# Patient Record
Sex: FEMALE | Race: WHITE | Employment: OTHER | URBAN - METROPOLITAN AREA
[De-identification: names, ages, dates, MRNs, and addresses within clinical notes are randomized per-mention and may not be internally consistent; named-entity substitution may affect disease eponyms.]

---

## 2017-01-16 ENCOUNTER — PROCEDURE VISIT (OUTPATIENT)
Dept: CARDIOLOGY CLINIC | Age: 82
End: 2017-01-16

## 2017-01-16 DIAGNOSIS — Z95.0 CARDIAC PACEMAKER IN SITU: Primary | ICD-10-CM

## 2017-01-16 PROCEDURE — 93296 REM INTERROG EVL PM/IDS: CPT | Performed by: INTERNAL MEDICINE

## 2017-01-16 PROCEDURE — 93294 REM INTERROG EVL PM/LDLS PM: CPT | Performed by: INTERNAL MEDICINE

## 2017-04-24 ENCOUNTER — TELEPHONE (OUTPATIENT)
Dept: CARDIOLOGY CLINIC | Age: 82
End: 2017-04-24

## 2017-04-24 ENCOUNTER — PROCEDURE VISIT (OUTPATIENT)
Dept: CARDIOLOGY CLINIC | Age: 82
End: 2017-04-24

## 2017-04-24 DIAGNOSIS — Z95.0 CARDIAC PACEMAKER IN SITU: Primary | ICD-10-CM

## 2017-04-24 PROCEDURE — 93294 REM INTERROG EVL PM/LDLS PM: CPT | Performed by: INTERNAL MEDICINE

## 2017-04-24 PROCEDURE — 93296 REM INTERROG EVL PM/IDS: CPT | Performed by: INTERNAL MEDICINE

## 2017-07-14 ENCOUNTER — TELEPHONE (OUTPATIENT)
Dept: CARDIOLOGY CLINIC | Age: 82
End: 2017-07-14

## 2017-07-31 ENCOUNTER — TELEPHONE (OUTPATIENT)
Dept: CARDIOLOGY CLINIC | Age: 82
End: 2017-07-31

## 2017-07-31 ENCOUNTER — PROCEDURE VISIT (OUTPATIENT)
Dept: CARDIOLOGY CLINIC | Age: 82
End: 2017-07-31

## 2017-07-31 DIAGNOSIS — Z95.0 CARDIAC PACEMAKER IN SITU: Primary | ICD-10-CM

## 2017-07-31 PROCEDURE — 93296 REM INTERROG EVL PM/IDS: CPT | Performed by: INTERNAL MEDICINE

## 2017-07-31 PROCEDURE — 93294 REM INTERROG EVL PM/LDLS PM: CPT | Performed by: INTERNAL MEDICINE

## 2017-09-26 ENCOUNTER — OFFICE VISIT (OUTPATIENT)
Dept: CARDIOLOGY CLINIC | Age: 82
End: 2017-09-26

## 2017-09-26 VITALS
SYSTOLIC BLOOD PRESSURE: 118 MMHG | HEIGHT: 65 IN | WEIGHT: 143 LBS | HEART RATE: 66 BPM | BODY MASS INDEX: 23.82 KG/M2 | DIASTOLIC BLOOD PRESSURE: 70 MMHG

## 2017-09-26 DIAGNOSIS — Z95.0 CARDIAC PACEMAKER IN SITU: Primary | ICD-10-CM

## 2017-09-26 DIAGNOSIS — I73.9 PVD (PERIPHERAL VASCULAR DISEASE) (HCC): ICD-10-CM

## 2017-09-26 PROCEDURE — 99214 OFFICE O/P EST MOD 30 MIN: CPT | Performed by: INTERNAL MEDICINE

## 2017-10-20 ENCOUNTER — TELEPHONE (OUTPATIENT)
Dept: CARDIOLOGY CLINIC | Age: 82
End: 2017-10-20

## 2017-11-06 ENCOUNTER — TELEPHONE (OUTPATIENT)
Dept: CARDIOLOGY CLINIC | Age: 82
End: 2017-11-06

## 2017-11-06 ENCOUNTER — PROCEDURE VISIT (OUTPATIENT)
Dept: CARDIOLOGY CLINIC | Age: 82
End: 2017-11-06

## 2017-11-06 DIAGNOSIS — Z95.0 CARDIAC PACEMAKER IN SITU: Primary | ICD-10-CM

## 2017-11-06 PROCEDURE — 93294 REM INTERROG EVL PM/LDLS PM: CPT | Performed by: INTERNAL MEDICINE

## 2017-11-06 PROCEDURE — 93296 REM INTERROG EVL PM/IDS: CPT | Performed by: INTERNAL MEDICINE

## 2018-01-01 ENCOUNTER — TELEPHONE (OUTPATIENT)
Dept: CARDIOLOGY CLINIC | Age: 83
End: 2018-01-01

## 2018-01-01 ENCOUNTER — PROCEDURE VISIT (OUTPATIENT)
Dept: CARDIOLOGY CLINIC | Age: 83
End: 2018-01-01

## 2018-01-01 ENCOUNTER — OFFICE VISIT (OUTPATIENT)
Dept: CARDIOLOGY CLINIC | Age: 83
End: 2018-01-01
Payer: COMMERCIAL

## 2018-01-01 ENCOUNTER — PROCEDURE VISIT (OUTPATIENT)
Dept: CARDIOLOGY CLINIC | Age: 83
End: 2018-01-01
Payer: COMMERCIAL

## 2018-01-01 VITALS
HEART RATE: 78 BPM | WEIGHT: 138 LBS | HEIGHT: 64 IN | DIASTOLIC BLOOD PRESSURE: 74 MMHG | SYSTOLIC BLOOD PRESSURE: 132 MMHG | BODY MASS INDEX: 23.56 KG/M2

## 2018-01-01 DIAGNOSIS — Z95.0 CARDIAC PACEMAKER IN SITU: Primary | ICD-10-CM

## 2018-01-01 DIAGNOSIS — I73.9 PVD (PERIPHERAL VASCULAR DISEASE) (HCC): Primary | ICD-10-CM

## 2018-01-01 PROCEDURE — 1090F PRES/ABSN URINE INCON ASSESS: CPT | Performed by: INTERNAL MEDICINE

## 2018-01-01 PROCEDURE — 93294 REM INTERROG EVL PM/LDLS PM: CPT | Performed by: INTERNAL MEDICINE

## 2018-01-01 PROCEDURE — 1101F PT FALLS ASSESS-DOCD LE1/YR: CPT | Performed by: INTERNAL MEDICINE

## 2018-01-01 PROCEDURE — 1036F TOBACCO NON-USER: CPT | Performed by: INTERNAL MEDICINE

## 2018-01-01 PROCEDURE — 99213 OFFICE O/P EST LOW 20 MIN: CPT | Performed by: INTERNAL MEDICINE

## 2018-01-01 PROCEDURE — G8420 CALC BMI NORM PARAMETERS: HCPCS | Performed by: INTERNAL MEDICINE

## 2018-01-01 PROCEDURE — G8427 DOCREV CUR MEDS BY ELIG CLIN: HCPCS | Performed by: INTERNAL MEDICINE

## 2018-01-01 PROCEDURE — G8484 FLU IMMUNIZE NO ADMIN: HCPCS | Performed by: INTERNAL MEDICINE

## 2018-01-01 PROCEDURE — 1123F ACP DISCUSS/DSCN MKR DOCD: CPT | Performed by: INTERNAL MEDICINE

## 2018-01-01 PROCEDURE — 4040F PNEUMOC VAC/ADMIN/RCVD: CPT | Performed by: INTERNAL MEDICINE

## 2018-01-01 PROCEDURE — 93296 REM INTERROG EVL PM/IDS: CPT | Performed by: INTERNAL MEDICINE

## 2018-02-12 ENCOUNTER — TELEPHONE (OUTPATIENT)
Dept: CARDIOLOGY CLINIC | Age: 83
End: 2018-02-12

## 2018-02-13 ENCOUNTER — PROCEDURE VISIT (OUTPATIENT)
Dept: CARDIOLOGY CLINIC | Age: 83
End: 2018-02-13

## 2018-02-13 DIAGNOSIS — Z95.0 CARDIAC PACEMAKER IN SITU: Primary | ICD-10-CM

## 2018-02-13 PROCEDURE — 93294 REM INTERROG EVL PM/LDLS PM: CPT | Performed by: INTERNAL MEDICINE

## 2018-02-13 PROCEDURE — 93296 REM INTERROG EVL PM/IDS: CPT | Performed by: INTERNAL MEDICINE

## 2018-05-21 ENCOUNTER — PROCEDURE VISIT (OUTPATIENT)
Dept: CARDIOLOGY CLINIC | Age: 83
End: 2018-05-21

## 2018-05-21 DIAGNOSIS — Z95.0 CARDIAC PACEMAKER IN SITU: Primary | ICD-10-CM

## 2018-05-21 PROCEDURE — 93296 REM INTERROG EVL PM/IDS: CPT | Performed by: INTERNAL MEDICINE

## 2018-05-21 PROCEDURE — 93294 REM INTERROG EVL PM/LDLS PM: CPT | Performed by: INTERNAL MEDICINE

## 2018-11-05 NOTE — PROGRESS NOTES
 VASCULAR SURGERY        As reviewed   Family History   Problem Relation Age of Onset    Dementia Father     Heart Failure Father         CVA    Stroke Father     Diabetes Mother     Heart Failure Brother         CVA     Social History   Substance Use Topics    Smoking status: Former Smoker     Packs/day: 1.00     Years: 50.00     Start date: 3/2/1960     Quit date: 3/2/1998    Smokeless tobacco: Never Used    Alcohol use No      Comment: 2 cups coffee daily      Review of Systems:    Constitutional: Negative for diaphoresis and fatigue  Psychological:Negative for anxiety or depression  HENT: Negative for headaches, nasal congestion, sinus pain or vertigo  Eyes: Negative for visual disturbance. Endocrine: Negative for polydipsia/polyuria  Respiratory: Negative for shortness of breath  Cardiovascular: Negative for chest pain, dyspnea on exertion, claudication, edema, irregular heartbeat, murmur, palpitations or shortness of breath  Gastrointestinal: Negative for abdominal pain or heartburn  Genito-Urinary: Negative for urinary frequency/urgency  Musculoskeletal: Negative for muscle pain, muscular weakness, negative for pain in arm and leg or swelling in foot and leg  Neurological: Negative for dizziness, headaches, memory loss, numbness/tingling, visual changes, syncope  Dermatological: Negative for rash    Objective:  /74   Pulse 78   Ht 5' 4\" (1.626 m)   Wt 138 lb (62.6 kg)   BMI 23.69 kg/m²   Wt Readings from Last 3 Encounters:   11/05/18 138 lb (62.6 kg)   02/06/18 143 lb (64.9 kg)   09/26/17 143 lb (64.9 kg)     Body mass index is 23.69 kg/m². GENERAL - Alert, oriented, pleasant, in no apparent distress. EYES: No jaundice, no conjunctival pallor. SKIN: It is warm & dry. No rashes. No Echhymosis    HEENT - No clinically significant abnormalities seen. Neck - Supple. No jugular venous distention noted. No carotid bruits.    Cardiovascular - Normal S1 and S2 without obvious murmur or

## 2018-12-03 NOTE — TELEPHONE ENCOUNTER
Called and reminded patient to send carelink transmission. She said that she did it yesterday. I told her that I did not get the transmission. Patient said that she would try again today.

## 2019-01-01 ENCOUNTER — APPOINTMENT (OUTPATIENT)
Dept: GENERAL RADIOLOGY | Age: 84
DRG: 180 | End: 2019-01-01
Payer: MEDICARE

## 2019-01-01 ENCOUNTER — HOSPITAL ENCOUNTER (OUTPATIENT)
Age: 84
Discharge: HOME | End: 2019-04-23
Payer: MEDICARE

## 2019-01-01 ENCOUNTER — PROCEDURE VISIT (OUTPATIENT)
Dept: CARDIOLOGY CLINIC | Age: 84
End: 2019-01-01
Payer: MEDICARE

## 2019-01-01 ENCOUNTER — HOSPITAL ENCOUNTER (INPATIENT)
Age: 84
LOS: 2 days | DRG: 356 | End: 2019-07-15
Attending: EMERGENCY MEDICINE | Admitting: INTERNAL MEDICINE
Payer: MEDICARE

## 2019-01-01 ENCOUNTER — PROCEDURE VISIT (OUTPATIENT)
Dept: CARDIOLOGY CLINIC | Age: 84
End: 2019-01-01
Payer: COMMERCIAL

## 2019-01-01 ENCOUNTER — HOSPITAL ENCOUNTER (INPATIENT)
Age: 84
LOS: 7 days | Discharge: HOME HEALTH | DRG: 180 | End: 2019-06-10
Attending: EMERGENCY MEDICINE | Admitting: INTERNAL MEDICINE
Payer: MEDICARE

## 2019-01-01 ENCOUNTER — APPOINTMENT (OUTPATIENT)
Dept: CT IMAGING | Age: 84
DRG: 180 | End: 2019-01-01
Payer: MEDICARE

## 2019-01-01 ENCOUNTER — TELEPHONE (OUTPATIENT)
Dept: CARDIOLOGY CLINIC | Age: 84
End: 2019-01-01

## 2019-01-01 ENCOUNTER — ANESTHESIA (OUTPATIENT)
Dept: OPERATING ROOM | Age: 84
DRG: 356 | End: 2019-01-01
Payer: MEDICARE

## 2019-01-01 ENCOUNTER — HOSPITAL ENCOUNTER (OUTPATIENT)
Age: 84
Setting detail: SPECIMEN
Discharge: HOME | End: 2019-06-24
Payer: MEDICARE

## 2019-01-01 ENCOUNTER — APPOINTMENT (OUTPATIENT)
Dept: GENERAL RADIOLOGY | Age: 84
DRG: 356 | End: 2019-01-01
Payer: MEDICARE

## 2019-01-01 ENCOUNTER — HOSPITAL ENCOUNTER (OUTPATIENT)
Age: 84
Setting detail: SPECIMEN
Discharge: HOME | End: 2019-07-01
Payer: MEDICARE

## 2019-01-01 ENCOUNTER — HOSPITAL ENCOUNTER (OUTPATIENT)
Dept: GENERAL RADIOLOGY | Age: 84
Discharge: HOME | End: 2019-04-23
Payer: MEDICARE

## 2019-01-01 ENCOUNTER — ANESTHESIA EVENT (OUTPATIENT)
Dept: OPERATING ROOM | Age: 84
DRG: 356 | End: 2019-01-01
Payer: MEDICARE

## 2019-01-01 ENCOUNTER — APPOINTMENT (OUTPATIENT)
Dept: MRI IMAGING | Age: 84
DRG: 180 | End: 2019-01-01
Payer: MEDICARE

## 2019-01-01 ENCOUNTER — APPOINTMENT (OUTPATIENT)
Dept: CT IMAGING | Age: 84
DRG: 356 | End: 2019-01-01
Payer: MEDICARE

## 2019-01-01 ENCOUNTER — HOSPITAL ENCOUNTER (OUTPATIENT)
Dept: INTERVENTIONAL RADIOLOGY/VASCULAR | Age: 84
Discharge: HOME | End: 2019-06-14
Payer: MEDICARE

## 2019-01-01 VITALS
BODY MASS INDEX: 21.99 KG/M2 | DIASTOLIC BLOOD PRESSURE: 36 MMHG | RESPIRATION RATE: 24 BRPM | OXYGEN SATURATION: 99 % | HEART RATE: 110 BPM | HEIGHT: 65 IN | WEIGHT: 132 LBS | SYSTOLIC BLOOD PRESSURE: 52 MMHG | TEMPERATURE: 98.4 F

## 2019-01-01 VITALS
RESPIRATION RATE: 16 BRPM | TEMPERATURE: 97.4 F | SYSTOLIC BLOOD PRESSURE: 146 MMHG | DIASTOLIC BLOOD PRESSURE: 58 MMHG | HEART RATE: 82 BPM | OXYGEN SATURATION: 97 %

## 2019-01-01 VITALS
SYSTOLIC BLOOD PRESSURE: 188 MMHG | DIASTOLIC BLOOD PRESSURE: 165 MMHG | RESPIRATION RATE: 1 BRPM | OXYGEN SATURATION: 100 %

## 2019-01-01 VITALS
RESPIRATION RATE: 16 BRPM | SYSTOLIC BLOOD PRESSURE: 148 MMHG | TEMPERATURE: 97.7 F | OXYGEN SATURATION: 93 % | BODY MASS INDEX: 24.8 KG/M2 | WEIGHT: 140 LBS | HEART RATE: 98 BPM | DIASTOLIC BLOOD PRESSURE: 63 MMHG | HEIGHT: 63 IN

## 2019-01-01 DIAGNOSIS — R11.2 NAUSEA AND VOMITING, INTRACTABILITY OF VOMITING NOT SPECIFIED, UNSPECIFIED VOMITING TYPE: Primary | ICD-10-CM

## 2019-01-01 DIAGNOSIS — R19.7 DIARRHEA, UNSPECIFIED TYPE: ICD-10-CM

## 2019-01-01 DIAGNOSIS — I49.5 SINUS NODE DYSFUNCTION (HCC): ICD-10-CM

## 2019-01-01 DIAGNOSIS — I44.0 AV BLOCK, 1ST DEGREE: ICD-10-CM

## 2019-01-01 DIAGNOSIS — C34.11 CANCER OF BRONCHUS OF RIGHT UPPER LOBE (HCC): ICD-10-CM

## 2019-01-01 DIAGNOSIS — D72.819 LEUKOPENIA, UNSPECIFIED TYPE: ICD-10-CM

## 2019-01-01 DIAGNOSIS — Z95.0 CARDIAC PACEMAKER IN SITU: Primary | ICD-10-CM

## 2019-01-01 DIAGNOSIS — R10.13 EPIGASTRIC PAIN: ICD-10-CM

## 2019-01-01 DIAGNOSIS — D64.9 ANEMIA, UNSPECIFIED TYPE: ICD-10-CM

## 2019-01-01 DIAGNOSIS — M19.072 ARTHRITIS OF LEFT FOOT: ICD-10-CM

## 2019-01-01 DIAGNOSIS — M79.672 LEFT FOOT PAIN: ICD-10-CM

## 2019-01-01 DIAGNOSIS — K56.7 ILEUS (HCC): ICD-10-CM

## 2019-01-01 DIAGNOSIS — R91.8 LUNG MASS: Primary | ICD-10-CM

## 2019-01-01 LAB
ABO/RH: NORMAL
ALBUMIN SERPL-MCNC: 3.2 GM/DL (ref 3.4–5)
ALBUMIN SERPL-MCNC: 3.2 GM/DL (ref 3.4–5)
ALBUMIN SERPL-MCNC: 3.3 GM/DL (ref 3.4–5)
ALBUMIN SERPL-MCNC: 3.6 GM/DL (ref 3.4–5)
ALBUMIN SERPL-MCNC: 3.8 GM/DL (ref 3.4–5)
ALBUMIN SERPL-MCNC: 3.8 GM/DL (ref 3.4–5)
ALBUMIN SERPL-MCNC: 3.9 GM/DL (ref 3.4–5)
ALBUMIN SERPL-MCNC: 4 GM/DL (ref 3.4–5)
ALP BLD-CCNC: 100 IU/L (ref 40–129)
ALP BLD-CCNC: 106 IU/L (ref 40–128)
ALP BLD-CCNC: 108 IU/L (ref 40–129)
ALP BLD-CCNC: 127 IU/L (ref 40–129)
ALP BLD-CCNC: 146 IU/L (ref 40–129)
ALP BLD-CCNC: 160 IU/L (ref 40–128)
ALP BLD-CCNC: 187 IU/L (ref 40–129)
ALP BLD-CCNC: 202 IU/L (ref 40–128)
ALT SERPL-CCNC: 11 U/L (ref 10–40)
ALT SERPL-CCNC: 13 U/L (ref 10–40)
ALT SERPL-CCNC: 13 U/L (ref 10–40)
ALT SERPL-CCNC: 15 U/L (ref 10–40)
ALT SERPL-CCNC: 17 U/L (ref 10–40)
ALT SERPL-CCNC: 17 U/L (ref 10–40)
ALT SERPL-CCNC: 19 U/L (ref 10–40)
ALT SERPL-CCNC: 25 U/L (ref 10–40)
ANION GAP SERPL CALCULATED.3IONS-SCNC: 12 MMOL/L (ref 4–16)
ANION GAP SERPL CALCULATED.3IONS-SCNC: 13 MMOL/L (ref 4–16)
ANION GAP SERPL CALCULATED.3IONS-SCNC: 13 MMOL/L (ref 4–16)
ANION GAP SERPL CALCULATED.3IONS-SCNC: 14 MMOL/L (ref 4–16)
ANION GAP SERPL CALCULATED.3IONS-SCNC: 14 MMOL/L (ref 4–16)
ANION GAP SERPL CALCULATED.3IONS-SCNC: 21 MMOL/L (ref 4–16)
ANISOCYTOSIS: ABNORMAL
ANTIBODY SCREEN: NEGATIVE
APTT: 23.4 SECONDS (ref 21.2–33)
AST SERPL-CCNC: 15 IU/L (ref 15–37)
AST SERPL-CCNC: 17 IU/L (ref 15–37)
AST SERPL-CCNC: 20 IU/L (ref 15–37)
AST SERPL-CCNC: 20 IU/L (ref 15–37)
AST SERPL-CCNC: 24 IU/L (ref 15–37)
AST SERPL-CCNC: 25 IU/L (ref 15–37)
AST SERPL-CCNC: 26 IU/L (ref 15–37)
AST SERPL-CCNC: 27 IU/L (ref 15–37)
ATYPICAL LYMPHOCYTE ABSOLUTE COUNT: ABNORMAL
ATYPICAL LYMPHOCYTE ABSOLUTE COUNT: ABNORMAL
BACTERIA: ABNORMAL /HPF
BANDED NEUTROPHILS ABSOLUTE COUNT: 0.31 K/CU MM
BANDED NEUTROPHILS ABSOLUTE COUNT: 0.62 K/CU MM
BANDED NEUTROPHILS ABSOLUTE COUNT: 1.09 K/CU MM
BANDED NEUTROPHILS ABSOLUTE COUNT: 2.41 K/CU MM
BANDED NEUTROPHILS RELATIVE PERCENT: 10 % (ref 5–11)
BANDED NEUTROPHILS RELATIVE PERCENT: 11 % (ref 5–11)
BANDED NEUTROPHILS RELATIVE PERCENT: 26 % (ref 5–11)
BANDED NEUTROPHILS RELATIVE PERCENT: 28 % (ref 5–11)
BASE EXCESS: ABNORMAL (ref 0–2.4)
BASOPHILIC STIPPLING: PRESENT
BASOPHILS ABSOLUTE: 0.1 K/CU MM
BASOPHILS RELATIVE PERCENT: 0.4 % (ref 0–1)
BASOPHILS RELATIVE PERCENT: 0.5 % (ref 0–1)
BASOPHILS RELATIVE PERCENT: 0.7 % (ref 0–1)
BASOPHILS RELATIVE PERCENT: 2 % (ref 0–1)
BETA-HYDROXYBUTYRATE: 6.8 MG/DL (ref 0–3)
BILIRUB SERPL-MCNC: 0.3 MG/DL (ref 0–1)
BILIRUB SERPL-MCNC: 0.3 MG/DL (ref 0–1)
BILIRUB SERPL-MCNC: 0.4 MG/DL (ref 0–1)
BILIRUB SERPL-MCNC: 0.4 MG/DL (ref 0–1)
BILIRUB SERPL-MCNC: 0.6 MG/DL (ref 0–1)
BILIRUB SERPL-MCNC: 0.6 MG/DL (ref 0–1)
BILIRUB SERPL-MCNC: 0.8 MG/DL (ref 0–1)
BILIRUB SERPL-MCNC: 1.4 MG/DL (ref 0–1)
BILIRUBIN DIRECT: 0.2 MG/DL (ref 0–0.3)
BILIRUBIN URINE: NEGATIVE MG/DL
BILIRUBIN, INDIRECT: 0.4 MG/DL (ref 0–0.7)
BLOOD, URINE: ABNORMAL
BUN BLDV-MCNC: 11 MG/DL (ref 6–23)
BUN BLDV-MCNC: 11 MG/DL (ref 6–23)
BUN BLDV-MCNC: 12 MG/DL (ref 6–23)
BUN BLDV-MCNC: 13 MG/DL (ref 6–23)
BUN BLDV-MCNC: 13 MG/DL (ref 6–23)
BUN BLDV-MCNC: 16 MG/DL (ref 6–23)
BUN BLDV-MCNC: 16 MG/DL (ref 6–23)
BUN BLDV-MCNC: 18 MG/DL (ref 6–23)
BUN BLDV-MCNC: 27 MG/DL (ref 6–23)
CALCIUM SERPL-MCNC: 7.9 MG/DL (ref 8.3–10.6)
CALCIUM SERPL-MCNC: 8.6 MG/DL (ref 8.3–10.6)
CALCIUM SERPL-MCNC: 8.7 MG/DL (ref 8.3–10.6)
CALCIUM SERPL-MCNC: 8.8 MG/DL (ref 8.3–10.6)
CALCIUM SERPL-MCNC: 8.9 MG/DL (ref 8.3–10.6)
CALCIUM SERPL-MCNC: 8.9 MG/DL (ref 8.3–10.6)
CALCIUM SERPL-MCNC: 9 MG/DL (ref 8.3–10.6)
CALCIUM SERPL-MCNC: 9.2 MG/DL (ref 8.3–10.6)
CALCIUM SERPL-MCNC: 9.2 MG/DL (ref 8.3–10.6)
CARBON MONOXIDE, BLOOD: 0.4 % (ref 0–5)
CEA: 1.4 NG/ML
CELLS COUNTED: 50
CELLS COUNTED: 50
CHLORIDE BLD-SCNC: 100 MMOL/L (ref 99–110)
CHLORIDE BLD-SCNC: 101 MMOL/L (ref 99–110)
CHLORIDE BLD-SCNC: 103 MMOL/L (ref 99–110)
CHLORIDE BLD-SCNC: 93 MMOL/L (ref 99–110)
CHLORIDE BLD-SCNC: 94 MMOL/L (ref 99–110)
CHLORIDE BLD-SCNC: 95 MMOL/L (ref 99–110)
CHLORIDE BLD-SCNC: 96 MMOL/L (ref 99–110)
CLARITY: ABNORMAL
CO2 CONTENT: 21.1 MMOL/L (ref 19–24)
CO2: 17 MMOL/L (ref 21–32)
CO2: 22 MMOL/L (ref 21–32)
CO2: 23 MMOL/L (ref 21–32)
CO2: 23 MMOL/L (ref 21–32)
CO2: 25 MMOL/L (ref 21–32)
CO2: 26 MMOL/L (ref 21–32)
CO2: 27 MMOL/L (ref 21–32)
COLOR: YELLOW
COMMENT: ABNORMAL
COMPONENT: NORMAL
CREAT SERPL-MCNC: 0.7 MG/DL (ref 0.6–1.1)
CREAT SERPL-MCNC: 0.9 MG/DL (ref 0.6–1.1)
CREAT SERPL-MCNC: 1 MG/DL (ref 0.6–1.1)
CREAT SERPL-MCNC: 1.2 MG/DL (ref 0.6–1.1)
CREAT SERPL-MCNC: 1.3 MG/DL (ref 0.6–1.1)
CROSSMATCH RESULT: NORMAL
DIFFERENTIAL TYPE: ABNORMAL
DOHLE BODIES: PRESENT
EKG ATRIAL RATE: 65 BPM
EKG DIAGNOSIS: NORMAL
EKG P AXIS: 19 DEGREES
EKG P-R INTERVAL: 226 MS
EKG Q-T INTERVAL: 436 MS
EKG QRS DURATION: 84 MS
EKG QTC CALCULATION (BAZETT): 453 MS
EKG R AXIS: -26 DEGREES
EKG T AXIS: 32 DEGREES
EKG VENTRICULAR RATE: 65 BPM
EOSINOPHILS ABSOLUTE: 0.1 K/CU MM
EOSINOPHILS ABSOLUTE: 0.1 K/CU MM
EOSINOPHILS ABSOLUTE: 0.2 K/CU MM
EOSINOPHILS ABSOLUTE: 0.3 K/CU MM
EOSINOPHILS RELATIVE PERCENT: 0.6 % (ref 0–3)
EOSINOPHILS RELATIVE PERCENT: 1.1 % (ref 0–3)
EOSINOPHILS RELATIVE PERCENT: 2 % (ref 0–3)
EOSINOPHILS RELATIVE PERCENT: 2.6 % (ref 0–3)
ESTIMATED AVERAGE GLUCOSE: 189 MG/DL
ESTIMATED AVERAGE GLUCOSE: 197 MG/DL
FERRITIN: 620 NG/ML (ref 15–150)
FOLATE: >20 NG/ML (ref 3.1–17.5)
GFR AFRICAN AMERICAN: 47 ML/MIN/1.73M2
GFR AFRICAN AMERICAN: 51 ML/MIN/1.73M2
GFR AFRICAN AMERICAN: >60 ML/MIN/1.73M2
GFR NON-AFRICAN AMERICAN: 39 ML/MIN/1.73M2
GFR NON-AFRICAN AMERICAN: 42 ML/MIN/1.73M2
GFR NON-AFRICAN AMERICAN: 52 ML/MIN/1.73M2
GFR NON-AFRICAN AMERICAN: 59 ML/MIN/1.73M2
GFR NON-AFRICAN AMERICAN: >60 ML/MIN/1.73M2
GIANT PLATELETS: PRESENT
GLUCOSE BLD-MCNC: 101 MG/DL (ref 70–99)
GLUCOSE BLD-MCNC: 118 MG/DL (ref 70–99)
GLUCOSE BLD-MCNC: 123 MG/DL (ref 70–99)
GLUCOSE BLD-MCNC: 131 MG/DL (ref 70–99)
GLUCOSE BLD-MCNC: 137 MG/DL (ref 70–99)
GLUCOSE BLD-MCNC: 150 MG/DL (ref 70–99)
GLUCOSE BLD-MCNC: 154 MG/DL (ref 70–99)
GLUCOSE BLD-MCNC: 154 MG/DL (ref 70–99)
GLUCOSE BLD-MCNC: 156 MG/DL (ref 70–99)
GLUCOSE BLD-MCNC: 162 MG/DL (ref 70–99)
GLUCOSE BLD-MCNC: 165 MG/DL (ref 70–99)
GLUCOSE BLD-MCNC: 169 MG/DL (ref 70–99)
GLUCOSE BLD-MCNC: 177 MG/DL (ref 70–99)
GLUCOSE BLD-MCNC: 186 MG/DL (ref 70–99)
GLUCOSE BLD-MCNC: 204 MG/DL (ref 70–99)
GLUCOSE BLD-MCNC: 208 MG/DL (ref 70–99)
GLUCOSE BLD-MCNC: 210 MG/DL (ref 70–99)
GLUCOSE BLD-MCNC: 217 MG/DL (ref 70–99)
GLUCOSE BLD-MCNC: 219 MG/DL (ref 70–99)
GLUCOSE BLD-MCNC: 225 MG/DL (ref 70–99)
GLUCOSE BLD-MCNC: 231 MG/DL (ref 70–99)
GLUCOSE BLD-MCNC: 236 MG/DL (ref 70–99)
GLUCOSE BLD-MCNC: 243 MG/DL (ref 70–99)
GLUCOSE BLD-MCNC: 256 MG/DL (ref 70–99)
GLUCOSE BLD-MCNC: 258 MG/DL (ref 70–99)
GLUCOSE BLD-MCNC: 279 MG/DL (ref 70–99)
GLUCOSE BLD-MCNC: 291 MG/DL (ref 70–99)
GLUCOSE BLD-MCNC: 298 MG/DL (ref 70–99)
GLUCOSE BLD-MCNC: 313 MG/DL (ref 70–99)
GLUCOSE BLD-MCNC: 339 MG/DL (ref 70–99)
GLUCOSE BLD-MCNC: 411 MG/DL (ref 70–99)
GLUCOSE BLD-MCNC: 42 MG/DL (ref 70–99)
GLUCOSE BLD-MCNC: 44 MG/DL (ref 70–99)
GLUCOSE BLD-MCNC: 474 MG/DL (ref 70–99)
GLUCOSE BLD-MCNC: 49 MG/DL (ref 70–99)
GLUCOSE BLD-MCNC: 49 MG/DL (ref 70–99)
GLUCOSE BLD-MCNC: 57 MG/DL (ref 70–99)
GLUCOSE BLD-MCNC: 65 MG/DL (ref 70–99)
GLUCOSE BLD-MCNC: 73 MG/DL (ref 70–99)
GLUCOSE BLD-MCNC: 79 MG/DL (ref 70–99)
GLUCOSE BLD-MCNC: 86 MG/DL (ref 70–99)
GLUCOSE BLD-MCNC: 89 MG/DL (ref 70–99)
GLUCOSE BLD-MCNC: 90 MG/DL (ref 70–99)
GLUCOSE BLD-MCNC: 97 MG/DL (ref 70–99)
GLUCOSE, URINE: NEGATIVE MG/DL
HBA1C MFR BLD: 8.2 % (ref 4.2–6.3)
HBA1C MFR BLD: 8.5 % (ref 4.2–6.3)
HCO3 ARTERIAL: 20.1 MMOL/L (ref 18–23)
HCT VFR BLD CALC: 19.6 % (ref 37–47)
HCT VFR BLD CALC: 32.1 % (ref 37–47)
HCT VFR BLD CALC: 34.4 % (ref 37–47)
HCT VFR BLD CALC: 35.3 % (ref 37–47)
HCT VFR BLD CALC: 36 % (ref 37–47)
HCT VFR BLD CALC: 36 % (ref 37–47)
HCT VFR BLD CALC: 36.9 % (ref 37–47)
HEMOGLOBIN: 10.2 GM/DL (ref 12.5–16)
HEMOGLOBIN: 10.7 GM/DL (ref 12.5–16)
HEMOGLOBIN: 10.8 GM/DL (ref 12.5–16)
HEMOGLOBIN: 10.9 GM/DL (ref 12.5–16)
HEMOGLOBIN: 11 GM/DL (ref 12.5–16)
HEMOGLOBIN: 11.7 GM/DL (ref 12.5–16)
HEMOGLOBIN: ABNORMAL GM/DL (ref 12.5–16)
HIGH SENSITIVE C-REACTIVE PROTEIN: 71 MG/L
HYPOCHROMIA: ABNORMAL
IMMATURE NEUTROPHIL %: 0.5 % (ref 0–0.43)
IMMATURE NEUTROPHIL %: 0.6 % (ref 0–0.43)
IMMATURE NEUTROPHIL %: 0.6 % (ref 0–0.43)
INR BLD: 1.13 INDEX
INR BLD: 1.51 INDEX
IRON: 25 UG/DL (ref 37–145)
KETONES, URINE: NEGATIVE MG/DL
LACTATE DEHYDROGENASE: 160 IU/L (ref 120–246)
LACTATE: 1.6 MMOL/L (ref 0.4–2)
LACTATE: ABNORMAL MMOL/L (ref 0.4–2)
LEUKOCYTE ESTERASE, URINE: ABNORMAL
LIPASE: 25 IU/L (ref 13–60)
LYMPHOCYTES ABSOLUTE: 0.5 K/CU MM
LYMPHOCYTES ABSOLUTE: 0.8 K/CU MM
LYMPHOCYTES ABSOLUTE: 1 K/CU MM
LYMPHOCYTES ABSOLUTE: 1.4 K/CU MM
LYMPHOCYTES ABSOLUTE: 1.8 K/CU MM
LYMPHOCYTES ABSOLUTE: 2.3 K/CU MM
LYMPHOCYTES ABSOLUTE: 2.3 K/CU MM
LYMPHOCYTES RELATIVE PERCENT: 13 % (ref 24–44)
LYMPHOCYTES RELATIVE PERCENT: 13.1 % (ref 24–44)
LYMPHOCYTES RELATIVE PERCENT: 14.2 % (ref 24–44)
LYMPHOCYTES RELATIVE PERCENT: 16.4 % (ref 24–44)
LYMPHOCYTES RELATIVE PERCENT: 32 % (ref 24–44)
LYMPHOCYTES RELATIVE PERCENT: 32 % (ref 24–44)
LYMPHOCYTES RELATIVE PERCENT: 8 % (ref 24–44)
MAGNESIUM: 1.6 MG/DL (ref 1.8–2.4)
MAGNESIUM: 1.8 MG/DL (ref 1.8–2.4)
MCH RBC QN AUTO: 28.2 PG (ref 27–31)
MCH RBC QN AUTO: 28.2 PG (ref 27–31)
MCH RBC QN AUTO: 28.3 PG (ref 27–31)
MCH RBC QN AUTO: 28.3 PG (ref 27–31)
MCH RBC QN AUTO: 28.4 PG (ref 27–31)
MCH RBC QN AUTO: 28.7 PG (ref 27–31)
MCH RBC QN AUTO: 28.7 PG (ref 27–31)
MCHC RBC AUTO-ENTMCNC: 29.7 % (ref 32–36)
MCHC RBC AUTO-ENTMCNC: 30 % (ref 32–36)
MCHC RBC AUTO-ENTMCNC: 31.2 % (ref 32–36)
MCHC RBC AUTO-ENTMCNC: 31.6 % (ref 32–36)
MCHC RBC AUTO-ENTMCNC: 31.7 % (ref 32–36)
MCHC RBC AUTO-ENTMCNC: 31.7 % (ref 32–36)
MCHC RBC AUTO-ENTMCNC: 31.8 % (ref 32–36)
MCV RBC AUTO: 89.1 FL (ref 78–100)
MCV RBC AUTO: 89.1 FL (ref 78–100)
MCV RBC AUTO: 89.2 FL (ref 78–100)
MCV RBC AUTO: 90.7 FL (ref 78–100)
MCV RBC AUTO: 91.2 FL (ref 78–100)
MCV RBC AUTO: 94.7 FL (ref 78–100)
MCV RBC AUTO: 95.7 FL (ref 78–100)
METAMYELOCYTES ABSOLUTE COUNT: 0.12 K/CU MM
METAMYELOCYTES ABSOLUTE COUNT: 0.19 K/CU MM
METAMYELOCYTES ABSOLUTE COUNT: 0.27 K/CU MM
METAMYELOCYTES PERCENT: 4 %
METAMYELOCYTES PERCENT: 7 %
METAMYELOCYTES PERCENT: 8 %
METHEMOGLOBIN ARTERIAL: 0.8 %
MONOCYTES ABSOLUTE: 0.1 K/CU MM
MONOCYTES ABSOLUTE: 0.4 K/CU MM
MONOCYTES ABSOLUTE: 0.5 K/CU MM
MONOCYTES ABSOLUTE: 1 K/CU MM
MONOCYTES ABSOLUTE: 1 K/CU MM
MONOCYTES ABSOLUTE: 1.1 K/CU MM
MONOCYTES ABSOLUTE: 1.5 K/CU MM
MONOCYTES RELATIVE PERCENT: 13 % (ref 0–4)
MONOCYTES RELATIVE PERCENT: 14 % (ref 0–4)
MONOCYTES RELATIVE PERCENT: 4 % (ref 0–4)
MONOCYTES RELATIVE PERCENT: 6.2 % (ref 0–4)
MONOCYTES RELATIVE PERCENT: 7 % (ref 0–4)
MONOCYTES RELATIVE PERCENT: 7.6 % (ref 0–4)
MONOCYTES RELATIVE PERCENT: 9.6 % (ref 0–4)
MUCUS: ABNORMAL HPF
MYELOCYTE PERCENT: 2 %
MYELOCYTE PERCENT: 4 %
MYELOCYTES ABSOLUTE COUNT: 0.08 K/CU MM
MYELOCYTES ABSOLUTE COUNT: 0.12 K/CU MM
NITRITE URINE, QUANTITATIVE: NEGATIVE
NUCLEATED RBC %: 0 %
NUCLEATED RED BLOOD CELLS: 2
O2 SATURATION: 97 % (ref 96–97)
OSMOLALITY: 291 MOS/L (ref 280–300)
PCO2 ARTERIAL: 34 MMHG (ref 32–45)
PCT TRANSFERRIN: 11 % (ref 10–44)
PDW BLD-RTO: 12.4 % (ref 11.7–14.9)
PDW BLD-RTO: 12.4 % (ref 11.7–14.9)
PDW BLD-RTO: 12.6 % (ref 11.7–14.9)
PDW BLD-RTO: 12.8 % (ref 11.7–14.9)
PDW BLD-RTO: 14.6 % (ref 11.7–14.9)
PDW BLD-RTO: 15 % (ref 11.7–14.9)
PDW BLD-RTO: 15.6 % (ref 11.7–14.9)
PH BLOOD: 7.38 (ref 7.34–7.45)
PH VENOUS: 7.46 (ref 7.32–7.42)
PH, URINE: 6 (ref 5–8)
PHOSPHORUS: 2.5 MG/DL (ref 2.5–4.9)
PLATELET # BLD: 140 K/CU MM (ref 140–440)
PLATELET # BLD: 150 K/CU MM (ref 140–440)
PLATELET # BLD: 282 K/CU MM (ref 140–440)
PLATELET # BLD: 313 K/CU MM (ref 140–440)
PLATELET # BLD: 337 K/CU MM (ref 140–440)
PLATELET # BLD: 356 K/CU MM (ref 140–440)
PLATELET # BLD: ABNORMAL K/CU MM (ref 140–440)
PLT MORPHOLOGY: ABNORMAL
PLT MORPHOLOGY: ABNORMAL
PMV BLD AUTO: 10.3 FL (ref 7.5–11.1)
PMV BLD AUTO: 10.4 FL (ref 7.5–11.1)
PMV BLD AUTO: 10.6 FL (ref 7.5–11.1)
PMV BLD AUTO: 10.7 FL (ref 7.5–11.1)
PMV BLD AUTO: 11 FL (ref 7.5–11.1)
PMV BLD AUTO: 11.3 FL (ref 7.5–11.1)
PMV BLD AUTO: 11.3 FL (ref 7.5–11.1)
PO2 ARTERIAL: 301 MMHG (ref 75–100)
POLYCHROMASIA: ABNORMAL
POTASSIUM SERPL-SCNC: 3.4 MMOL/L (ref 3.5–5.1)
POTASSIUM SERPL-SCNC: 3.9 MMOL/L (ref 3.5–5.1)
POTASSIUM SERPL-SCNC: 4 MMOL/L (ref 3.5–5.1)
POTASSIUM SERPL-SCNC: 4.2 MMOL/L (ref 3.5–5.1)
POTASSIUM SERPL-SCNC: 4.3 MMOL/L (ref 3.5–5.1)
POTASSIUM SERPL-SCNC: 4.4 MMOL/L (ref 3.5–5.1)
POTASSIUM SERPL-SCNC: 4.9 MMOL/L (ref 3.5–5.1)
PROCALCITONIN: 0.23
PROCALCITONIN: 0.32
PROTEIN UA: 100 MG/DL
PROTHROMBIN TIME: 12.9 SECONDS (ref 9.12–12.5)
PROTHROMBIN TIME: 17.2 SECONDS (ref 9.12–12.5)
RBC # BLD: 2.16 M/CU MM (ref 4.2–5.4)
RBC # BLD: 3.6 M/CU MM (ref 4.2–5.4)
RBC # BLD: 3.76 M/CU MM (ref 4.2–5.4)
RBC # BLD: 3.8 M/CU MM (ref 4.2–5.4)
RBC # BLD: 3.86 M/CU MM (ref 4.2–5.4)
RBC # BLD: 3.87 M/CU MM (ref 4.2–5.4)
RBC # BLD: 4.14 M/CU MM (ref 4.2–5.4)
RBC URINE: 10 /HPF (ref 0–6)
REASON FOR REJECTION: NORMAL
REASON FOR REJECTION: NORMAL
REJECTED TEST: NORMAL
SEGMENTED NEUTROPHILS ABSOLUTE COUNT: 0.7 K/CU MM
SEGMENTED NEUTROPHILS ABSOLUTE COUNT: 0.9 K/CU MM
SEGMENTED NEUTROPHILS ABSOLUTE COUNT: 1.5 K/CU MM
SEGMENTED NEUTROPHILS ABSOLUTE COUNT: 10.6 K/CU MM
SEGMENTED NEUTROPHILS ABSOLUTE COUNT: 12.3 K/CU MM
SEGMENTED NEUTROPHILS ABSOLUTE COUNT: 16.2 K/CU MM
SEGMENTED NEUTROPHILS ABSOLUTE COUNT: 7.9 K/CU MM
SEGMENTED NEUTROPHILS RELATIVE PERCENT: 30 % (ref 36–66)
SEGMENTED NEUTROPHILS RELATIVE PERCENT: 32 % (ref 36–66)
SEGMENTED NEUTROPHILS RELATIVE PERCENT: 37 % (ref 36–66)
SEGMENTED NEUTROPHILS RELATIVE PERCENT: 73.4 % (ref 36–66)
SEGMENTED NEUTROPHILS RELATIVE PERCENT: 73.9 % (ref 36–66)
SEGMENTED NEUTROPHILS RELATIVE PERCENT: 74 % (ref 36–66)
SEGMENTED NEUTROPHILS RELATIVE PERCENT: 78 % (ref 36–66)
SODIUM BLD-SCNC: 130 MMOL/L (ref 135–145)
SODIUM BLD-SCNC: 130 MMOL/L (ref 135–145)
SODIUM BLD-SCNC: 133 MMOL/L (ref 135–145)
SODIUM BLD-SCNC: 133 MMOL/L (ref 135–145)
SODIUM BLD-SCNC: 134 MMOL/L (ref 135–145)
SODIUM BLD-SCNC: 135 MMOL/L (ref 135–145)
SODIUM BLD-SCNC: 138 MMOL/L (ref 135–145)
SODIUM BLD-SCNC: 139 MMOL/L (ref 135–145)
SODIUM BLD-SCNC: 140 MMOL/L (ref 135–145)
SPECIFIC GRAVITY UA: 1.01 (ref 1–1.03)
SQUAMOUS EPITHELIAL: <1 /HPF
STATUS: NORMAL
STOMATOCYTES: ABNORMAL
TOTAL IMMATURE NEUTOROPHIL: 0.06 K/CU MM
TOTAL IMMATURE NEUTOROPHIL: 0.07 K/CU MM
TOTAL IMMATURE NEUTOROPHIL: 0.1 K/CU MM
TOTAL IRON BINDING CAPACITY: 232 UG/DL (ref 250–450)
TOTAL NUCLEATED RBC: 0 K/CU MM
TOTAL PROTEIN: 6.1 GM/DL (ref 6.4–8.2)
TOTAL PROTEIN: 6.2 GM/DL (ref 6.4–8.2)
TOTAL PROTEIN: 6.4 GM/DL (ref 6.4–8.2)
TOTAL PROTEIN: 6.4 GM/DL (ref 6.4–8.2)
TOTAL PROTEIN: 6.5 GM/DL (ref 6.4–8.2)
TOTAL PROTEIN: 6.8 GM/DL (ref 6.4–8.2)
TOTAL PROTEIN: 7.3 GM/DL (ref 6.4–8.2)
TOTAL PROTEIN: 7.4 GM/DL (ref 6.4–8.2)
TOXIC GRANULATION: PRESENT
TRANSFUSION STATUS: NORMAL
TRICHOMONAS: ABNORMAL /HPF
TROPONIN T: <0.01 NG/ML
TROPONIN T: <0.01 NG/ML
UNIT DIVISION: 0
UNIT NUMBER: NORMAL
UNSATURATED IRON BINDING CAPACITY: 207 UG/DL (ref 110–370)
UROBILINOGEN, URINE: NORMAL MG/DL (ref 0.2–1)
VITAMIN B-12: >2000 PG/ML (ref 211–911)
WBC # BLD: 10.7 K/CU MM (ref 4–10.5)
WBC # BLD: 14.3 K/CU MM (ref 4–10.5)
WBC # BLD: 15.8 K/CU MM (ref 4–10.5)
WBC # BLD: 2.4 K/CU MM (ref 4–10.5)
WBC # BLD: 21.9 K/CU MM (ref 4–10.5)
WBC # BLD: 3.1 K/CU MM (ref 4–10.5)
WBC # BLD: 3.9 K/CU MM (ref 4–10.5)
WBC # BLD: ABNORMAL 10*3/UL
WBC # BLD: ABNORMAL 10*3/UL
WBC CLUMP: ABNORMAL /HPF
WBC UA: 60 /HPF (ref 0–5)

## 2019-01-01 PROCEDURE — 85025 COMPLETE CBC W/AUTO DIFF WBC: CPT

## 2019-01-01 PROCEDURE — 1200000000 HC SEMI PRIVATE

## 2019-01-01 PROCEDURE — 86900 BLOOD TYPING SEROLOGIC ABO: CPT

## 2019-01-01 PROCEDURE — 2580000003 HC RX 258: Performed by: EMERGENCY MEDICINE

## 2019-01-01 PROCEDURE — 82746 ASSAY OF FOLIC ACID SERUM: CPT

## 2019-01-01 PROCEDURE — 85007 BL SMEAR W/DIFF WBC COUNT: CPT

## 2019-01-01 PROCEDURE — 71045 X-RAY EXAM CHEST 1 VIEW: CPT

## 2019-01-01 PROCEDURE — 6360000002 HC RX W HCPCS: Performed by: RADIOLOGY

## 2019-01-01 PROCEDURE — 6370000000 HC RX 637 (ALT 250 FOR IP): Performed by: INTERNAL MEDICINE

## 2019-01-01 PROCEDURE — 2580000003 HC RX 258: Performed by: INTERNAL MEDICINE

## 2019-01-01 PROCEDURE — 94002 VENT MGMT INPAT INIT DAY: CPT

## 2019-01-01 PROCEDURE — 6360000002 HC RX W HCPCS: Performed by: INTERNAL MEDICINE

## 2019-01-01 PROCEDURE — 2709999900 HC NON-CHARGEABLE SUPPLY: Performed by: SURGERY

## 2019-01-01 PROCEDURE — 82962 GLUCOSE BLOOD TEST: CPT

## 2019-01-01 PROCEDURE — 85027 COMPLETE CBC AUTOMATED: CPT

## 2019-01-01 PROCEDURE — 80053 COMPREHEN METABOLIC PANEL: CPT

## 2019-01-01 PROCEDURE — 2500000003 HC RX 250 WO HCPCS: Performed by: SURGERY

## 2019-01-01 PROCEDURE — 82010 KETONE BODYS QUAN: CPT

## 2019-01-01 PROCEDURE — 83605 ASSAY OF LACTIC ACID: CPT

## 2019-01-01 PROCEDURE — 2700000000 HC OXYGEN THERAPY PER DAY

## 2019-01-01 PROCEDURE — 71046 X-RAY EXAM CHEST 2 VIEWS: CPT

## 2019-01-01 PROCEDURE — 2709999900 HC NON-CHARGEABLE SUPPLY

## 2019-01-01 PROCEDURE — 99285 EMERGENCY DEPT VISIT HI MDM: CPT

## 2019-01-01 PROCEDURE — 84145 PROCALCITONIN (PCT): CPT

## 2019-01-01 PROCEDURE — 6360000002 HC RX W HCPCS

## 2019-01-01 PROCEDURE — 89220 SPUTUM SPECIMEN COLLECTION: CPT

## 2019-01-01 PROCEDURE — 2500000003 HC RX 250 WO HCPCS

## 2019-01-01 PROCEDURE — 88334 PATH CONSLTJ SURG CYTO XM EA: CPT

## 2019-01-01 PROCEDURE — 97116 GAIT TRAINING THERAPY: CPT

## 2019-01-01 PROCEDURE — 97530 THERAPEUTIC ACTIVITIES: CPT

## 2019-01-01 PROCEDURE — C1788 PORT, INDWELLING, IMP: HCPCS

## 2019-01-01 PROCEDURE — 72170 X-RAY EXAM OF PELVIS: CPT

## 2019-01-01 PROCEDURE — 83550 IRON BINDING TEST: CPT

## 2019-01-01 PROCEDURE — 94761 N-INVAS EAR/PLS OXIMETRY MLT: CPT

## 2019-01-01 PROCEDURE — 6360000002 HC RX W HCPCS: Performed by: PHYSICIAN ASSISTANT

## 2019-01-01 PROCEDURE — 3600000014 HC SURGERY LEVEL 4 ADDTL 15MIN: Performed by: SURGERY

## 2019-01-01 PROCEDURE — 86850 RBC ANTIBODY SCREEN: CPT

## 2019-01-01 PROCEDURE — 88333 PATH CONSLTJ SURG CYTO XM 1: CPT

## 2019-01-01 PROCEDURE — 7100000001 HC PACU RECOVERY - ADDTL 15 MIN: Performed by: SURGERY

## 2019-01-01 PROCEDURE — 2580000003 HC RX 258: Performed by: RADIOLOGY

## 2019-01-01 PROCEDURE — 83036 HEMOGLOBIN GLYCOSYLATED A1C: CPT

## 2019-01-01 PROCEDURE — 49000 EXPLORATION OF ABDOMEN: CPT | Performed by: SURGERY

## 2019-01-01 PROCEDURE — 5A1935Z RESPIRATORY VENTILATION, LESS THAN 24 CONSECUTIVE HOURS: ICD-10-PCS | Performed by: INTERNAL MEDICINE

## 2019-01-01 PROCEDURE — 6360000004 HC RX CONTRAST MEDICATION: Performed by: INTERNAL MEDICINE

## 2019-01-01 PROCEDURE — 94750 HC PULMONARY COMPLIANCE STUDY: CPT

## 2019-01-01 PROCEDURE — 6360000004 HC RX CONTRAST MEDICATION: Performed by: EMERGENCY MEDICINE

## 2019-01-01 PROCEDURE — 3600000004 HC SURGERY LEVEL 4 BASE: Performed by: SURGERY

## 2019-01-01 PROCEDURE — 2000000000 HC ICU R&B

## 2019-01-01 PROCEDURE — 96374 THER/PROPH/DIAG INJ IV PUSH: CPT

## 2019-01-01 PROCEDURE — 82378 CARCINOEMBRYONIC ANTIGEN: CPT

## 2019-01-01 PROCEDURE — 74177 CT ABD & PELVIS W/CONTRAST: CPT

## 2019-01-01 PROCEDURE — 70553 MRI BRAIN STEM W/O & W/DYE: CPT

## 2019-01-01 PROCEDURE — A9577 INJ MULTIHANCE: HCPCS | Performed by: INTERNAL MEDICINE

## 2019-01-01 PROCEDURE — 86922 COMPATIBILITY TEST ANTIGLOB: CPT

## 2019-01-01 PROCEDURE — 0BBC3ZX EXCISION OF RIGHT UPPER LUNG LOBE, PERCUTANEOUS APPROACH, DIAGNOSTIC: ICD-10-PCS | Performed by: RADIOLOGY

## 2019-01-01 PROCEDURE — 77001 FLUOROGUIDE FOR VEIN DEVICE: CPT

## 2019-01-01 PROCEDURE — 7100000000 HC PACU RECOVERY - FIRST 15 MIN: Performed by: SURGERY

## 2019-01-01 PROCEDURE — 6370000000 HC RX 637 (ALT 250 FOR IP): Performed by: PHYSICIAN ASSISTANT

## 2019-01-01 PROCEDURE — 94003 VENT MGMT INPAT SUBQ DAY: CPT

## 2019-01-01 PROCEDURE — 83735 ASSAY OF MAGNESIUM: CPT

## 2019-01-01 PROCEDURE — 84100 ASSAY OF PHOSPHORUS: CPT

## 2019-01-01 PROCEDURE — 96376 TX/PRO/DX INJ SAME DRUG ADON: CPT

## 2019-01-01 PROCEDURE — 82272 OCCULT BLD FECES 1-3 TESTS: CPT

## 2019-01-01 PROCEDURE — 32405 CT NEEDLE BIOPSY LUNG PERCUTANEOUS: CPT

## 2019-01-01 PROCEDURE — 84484 ASSAY OF TROPONIN QUANT: CPT

## 2019-01-01 PROCEDURE — 70450 CT HEAD/BRAIN W/O DYE: CPT

## 2019-01-01 PROCEDURE — 82728 ASSAY OF FERRITIN: CPT

## 2019-01-01 PROCEDURE — 7100000010 HC PHASE II RECOVERY - FIRST 15 MIN

## 2019-01-01 PROCEDURE — 97110 THERAPEUTIC EXERCISES: CPT

## 2019-01-01 PROCEDURE — 3700000001 HC ADD 15 MINUTES (ANESTHESIA): Performed by: SURGERY

## 2019-01-01 PROCEDURE — 93005 ELECTROCARDIOGRAM TRACING: CPT | Performed by: EMERGENCY MEDICINE

## 2019-01-01 PROCEDURE — 83930 ASSAY OF BLOOD OSMOLALITY: CPT

## 2019-01-01 PROCEDURE — 2580000003 HC RX 258: Performed by: ANESTHESIOLOGY

## 2019-01-01 PROCEDURE — 6370000000 HC RX 637 (ALT 250 FOR IP): Performed by: GENERAL PRACTICE

## 2019-01-01 PROCEDURE — 94640 AIRWAY INHALATION TREATMENT: CPT

## 2019-01-01 PROCEDURE — 2580000003 HC RX 258: Performed by: SURGERY

## 2019-01-01 PROCEDURE — 36415 COLL VENOUS BLD VENIPUNCTURE: CPT

## 2019-01-01 PROCEDURE — 3700000000 HC ANESTHESIA ATTENDED CARE: Performed by: SURGERY

## 2019-01-01 PROCEDURE — 93294 REM INTERROG EVL PM/LDLS PM: CPT | Performed by: INTERNAL MEDICINE

## 2019-01-01 PROCEDURE — 6360000002 HC RX W HCPCS: Performed by: NURSE ANESTHETIST, CERTIFIED REGISTERED

## 2019-01-01 PROCEDURE — 93010 ELECTROCARDIOGRAM REPORT: CPT | Performed by: INTERNAL MEDICINE

## 2019-01-01 PROCEDURE — 93296 REM INTERROG EVL PM/IDS: CPT | Performed by: INTERNAL MEDICINE

## 2019-01-01 PROCEDURE — P9016 RBC LEUKOCYTES REDUCED: HCPCS

## 2019-01-01 PROCEDURE — 72125 CT NECK SPINE W/O DYE: CPT

## 2019-01-01 PROCEDURE — 6360000002 HC RX W HCPCS: Performed by: SURGERY

## 2019-01-01 PROCEDURE — 97163 PT EVAL HIGH COMPLEX 45 MIN: CPT

## 2019-01-01 PROCEDURE — 7100000011 HC PHASE II RECOVERY - ADDTL 15 MIN

## 2019-01-01 PROCEDURE — 88305 TISSUE EXAM BY PATHOLOGIST: CPT

## 2019-01-01 PROCEDURE — 81001 URINALYSIS AUTO W/SCOPE: CPT

## 2019-01-01 PROCEDURE — 83690 ASSAY OF LIPASE: CPT

## 2019-01-01 PROCEDURE — 2580000003 HC RX 258: Performed by: PHYSICIAN ASSISTANT

## 2019-01-01 PROCEDURE — 6370000000 HC RX 637 (ALT 250 FOR IP)

## 2019-01-01 PROCEDURE — 2580000003 HC RX 258

## 2019-01-01 PROCEDURE — 73501 X-RAY EXAM HIP UNI 1 VIEW: CPT

## 2019-01-01 PROCEDURE — 99291 CRITICAL CARE FIRST HOUR: CPT | Performed by: SURGERY

## 2019-01-01 PROCEDURE — 36561 INSERT TUNNELED CV CATH: CPT

## 2019-01-01 PROCEDURE — 82607 VITAMIN B-12: CPT

## 2019-01-01 PROCEDURE — 83615 LACTATE (LD) (LDH) ENZYME: CPT

## 2019-01-01 PROCEDURE — 80048 BASIC METABOLIC PNL TOTAL CA: CPT

## 2019-01-01 PROCEDURE — 88341 IMHCHEM/IMCYTCHM EA ADD ANTB: CPT

## 2019-01-01 PROCEDURE — 36430 TRANSFUSION BLD/BLD COMPNT: CPT

## 2019-01-01 PROCEDURE — 36600 WITHDRAWAL OF ARTERIAL BLOOD: CPT

## 2019-01-01 PROCEDURE — 96375 TX/PRO/DX INJ NEW DRUG ADDON: CPT

## 2019-01-01 PROCEDURE — 2500000003 HC RX 250 WO HCPCS: Performed by: NURSE ANESTHETIST, CERTIFIED REGISTERED

## 2019-01-01 PROCEDURE — 71250 CT THORAX DX C-: CPT

## 2019-01-01 PROCEDURE — 86901 BLOOD TYPING SEROLOGIC RH(D): CPT

## 2019-01-01 PROCEDURE — 73630 X-RAY EXAM OF FOOT: CPT

## 2019-01-01 PROCEDURE — 83540 ASSAY OF IRON: CPT

## 2019-01-01 PROCEDURE — 85730 THROMBOPLASTIN TIME PARTIAL: CPT

## 2019-01-01 PROCEDURE — 82803 BLOOD GASES ANY COMBINATION: CPT

## 2019-01-01 PROCEDURE — 6360000002 HC RX W HCPCS: Performed by: EMERGENCY MEDICINE

## 2019-01-01 PROCEDURE — 6370000000 HC RX 637 (ALT 250 FOR IP): Performed by: EMERGENCY MEDICINE

## 2019-01-01 PROCEDURE — C1894 INTRO/SHEATH, NON-LASER: HCPCS

## 2019-01-01 PROCEDURE — 86141 C-REACTIVE PROTEIN HS: CPT

## 2019-01-01 PROCEDURE — 0WJG0ZZ INSPECTION OF PERITONEAL CAVITY, OPEN APPROACH: ICD-10-PCS | Performed by: SURGERY

## 2019-01-01 PROCEDURE — 85610 PROTHROMBIN TIME: CPT

## 2019-01-01 PROCEDURE — 99221 1ST HOSP IP/OBS SF/LOW 40: CPT | Performed by: INTERNAL MEDICINE

## 2019-01-01 PROCEDURE — 73552 X-RAY EXAM OF FEMUR 2/>: CPT

## 2019-01-01 PROCEDURE — 82248 BILIRUBIN DIRECT: CPT

## 2019-01-01 PROCEDURE — 88342 IMHCHEM/IMCYTCHM 1ST ANTB: CPT

## 2019-01-01 PROCEDURE — 97166 OT EVAL MOD COMPLEX 45 MIN: CPT

## 2019-01-01 PROCEDURE — 76937 US GUIDE VASCULAR ACCESS: CPT

## 2019-01-01 PROCEDURE — 82800 BLOOD PH: CPT

## 2019-01-01 PROCEDURE — 2720000010 HC SURG SUPPLY STERILE: Performed by: SURGERY

## 2019-01-01 RX ORDER — MIRTAZAPINE 15 MG/1
7.5 TABLET, FILM COATED ORAL NIGHTLY
Status: DISCONTINUED | OUTPATIENT
Start: 2019-01-01 | End: 2019-01-01 | Stop reason: HOSPADM

## 2019-01-01 RX ORDER — ONDANSETRON 2 MG/ML
4 INJECTION INTRAMUSCULAR; INTRAVENOUS ONCE
Status: COMPLETED | OUTPATIENT
Start: 2019-01-01 | End: 2019-01-01

## 2019-01-01 RX ORDER — MORPHINE SULFATE 4 MG/ML
2 INJECTION, SOLUTION INTRAMUSCULAR; INTRAVENOUS
Status: COMPLETED | OUTPATIENT
Start: 2019-01-01 | End: 2019-01-01

## 2019-01-01 RX ORDER — AMLODIPINE BESYLATE 5 MG/1
10 TABLET ORAL DAILY
Status: DISCONTINUED | OUTPATIENT
Start: 2019-01-01 | End: 2019-01-01 | Stop reason: HOSPADM

## 2019-01-01 RX ORDER — SODIUM CHLORIDE 9 MG/ML
INJECTION, SOLUTION INTRAVENOUS CONTINUOUS
Status: DISCONTINUED | OUTPATIENT
Start: 2019-01-01 | End: 2019-01-01

## 2019-01-01 RX ORDER — AMLODIPINE BESYLATE 10 MG/1
10 TABLET ORAL DAILY
Status: DISCONTINUED | OUTPATIENT
Start: 2019-01-01 | End: 2019-01-01 | Stop reason: HOSPADM

## 2019-01-01 RX ORDER — 0.9 % SODIUM CHLORIDE 0.9 %
500 INTRAVENOUS SOLUTION INTRAVENOUS ONCE
Status: COMPLETED | OUTPATIENT
Start: 2019-01-01 | End: 2019-01-01

## 2019-01-01 RX ORDER — LATANOPROST 50 UG/ML
1 SOLUTION/ DROPS OPHTHALMIC NIGHTLY
Status: DISCONTINUED | OUTPATIENT
Start: 2019-01-01 | End: 2019-01-01 | Stop reason: HOSPADM

## 2019-01-01 RX ORDER — CEFAZOLIN SODIUM 2 G/100ML
2 INJECTION, SOLUTION INTRAVENOUS ONCE
Status: COMPLETED | OUTPATIENT
Start: 2019-01-01 | End: 2019-01-01

## 2019-01-01 RX ORDER — TRAZODONE HYDROCHLORIDE 50 MG/1
50 TABLET ORAL NIGHTLY
Qty: 30 TABLET | Refills: 5 | Status: SHIPPED | OUTPATIENT
Start: 2019-01-01

## 2019-01-01 RX ORDER — HYDROCODONE BITARTRATE AND ACETAMINOPHEN 5; 325 MG/1; MG/1
1 TABLET ORAL EVERY 6 HOURS PRN
Status: DISCONTINUED | OUTPATIENT
Start: 2019-01-01 | End: 2019-01-01 | Stop reason: HOSPADM

## 2019-01-01 RX ORDER — DEXTROSE MONOHYDRATE 25 G/50ML
12.5 INJECTION, SOLUTION INTRAVENOUS PRN
Status: DISCONTINUED | OUTPATIENT
Start: 2019-01-01 | End: 2019-01-01 | Stop reason: HOSPADM

## 2019-01-01 RX ORDER — SODIUM CHLORIDE 0.9 % (FLUSH) 0.9 %
10 SYRINGE (ML) INJECTION EVERY 12 HOURS SCHEDULED
Status: DISCONTINUED | OUTPATIENT
Start: 2019-01-01 | End: 2019-01-01 | Stop reason: HOSPADM

## 2019-01-01 RX ORDER — 0.9 % SODIUM CHLORIDE 0.9 %
1000 INTRAVENOUS SOLUTION INTRAVENOUS ONCE
Status: COMPLETED | OUTPATIENT
Start: 2019-01-01 | End: 2019-01-01

## 2019-01-01 RX ORDER — CHLORHEXIDINE GLUCONATE 0.12 MG/ML
15 RINSE ORAL 2 TIMES DAILY
Status: DISCONTINUED | OUTPATIENT
Start: 2019-01-01 | End: 2019-01-01

## 2019-01-01 RX ORDER — HYDROMORPHONE HCL 110MG/55ML
0.25 PATIENT CONTROLLED ANALGESIA SYRINGE INTRAVENOUS EVERY 5 MIN PRN
Status: DISCONTINUED | OUTPATIENT
Start: 2019-01-01 | End: 2019-01-01 | Stop reason: HOSPADM

## 2019-01-01 RX ORDER — SUCCINYLCHOLINE/SOD CL,ISO/PF 100 MG/5ML
SYRINGE (ML) INTRAVENOUS PRN
Status: DISCONTINUED | OUTPATIENT
Start: 2019-01-01 | End: 2019-01-01 | Stop reason: SDUPTHER

## 2019-01-01 RX ORDER — TRAZODONE HYDROCHLORIDE 50 MG/1
50 TABLET ORAL NIGHTLY
Status: DISCONTINUED | OUTPATIENT
Start: 2019-01-01 | End: 2019-01-01 | Stop reason: HOSPADM

## 2019-01-01 RX ORDER — PROPOFOL 10 MG/ML
INJECTION, EMULSION INTRAVENOUS
Status: COMPLETED
Start: 2019-01-01 | End: 2019-01-01

## 2019-01-01 RX ORDER — ONDANSETRON 2 MG/ML
INJECTION INTRAMUSCULAR; INTRAVENOUS
Status: COMPLETED
Start: 2019-01-01 | End: 2019-01-01

## 2019-01-01 RX ORDER — ONDANSETRON 2 MG/ML
4 INJECTION INTRAMUSCULAR; INTRAVENOUS EVERY 6 HOURS PRN
Status: DISCONTINUED | OUTPATIENT
Start: 2019-01-01 | End: 2019-01-01 | Stop reason: HOSPADM

## 2019-01-01 RX ORDER — LABETALOL 20 MG/4 ML (5 MG/ML) INTRAVENOUS SYRINGE
5 ONCE
Status: DISCONTINUED | OUTPATIENT
Start: 2019-01-01 | End: 2019-01-01 | Stop reason: HOSPADM

## 2019-01-01 RX ORDER — PROMETHAZINE HYDROCHLORIDE 25 MG/ML
6.25 INJECTION, SOLUTION INTRAMUSCULAR; INTRAVENOUS
Status: ACTIVE | OUTPATIENT
Start: 2019-01-01 | End: 2019-01-01

## 2019-01-01 RX ORDER — HYDROMORPHONE HCL 110MG/55ML
1 PATIENT CONTROLLED ANALGESIA SYRINGE INTRAVENOUS EVERY 4 HOURS PRN
Status: DISCONTINUED | OUTPATIENT
Start: 2019-01-01 | End: 2019-01-01 | Stop reason: HOSPADM

## 2019-01-01 RX ORDER — ALBUTEROL SULFATE 90 UG/1
2 AEROSOL, METERED RESPIRATORY (INHALATION) 4 TIMES DAILY PRN
Qty: 3 INHALER | Refills: 1 | Status: SHIPPED | OUTPATIENT
Start: 2019-01-01

## 2019-01-01 RX ORDER — PROPOFOL 10 MG/ML
INJECTION, EMULSION INTRAVENOUS PRN
Status: DISCONTINUED | OUTPATIENT
Start: 2019-01-01 | End: 2019-01-01 | Stop reason: SDUPTHER

## 2019-01-01 RX ORDER — SENNA PLUS 8.6 MG/1
1 TABLET ORAL DAILY PRN
Status: DISCONTINUED | OUTPATIENT
Start: 2019-01-01 | End: 2019-01-01 | Stop reason: HOSPADM

## 2019-01-01 RX ORDER — ALBUTEROL SULFATE 90 UG/1
2 AEROSOL, METERED RESPIRATORY (INHALATION) 4 TIMES DAILY PRN
Status: DISCONTINUED | OUTPATIENT
Start: 2019-01-01 | End: 2019-01-01 | Stop reason: HOSPADM

## 2019-01-01 RX ORDER — HYDRALAZINE HYDROCHLORIDE 20 MG/ML
5 INJECTION INTRAMUSCULAR; INTRAVENOUS EVERY 10 MIN PRN
Status: DISCONTINUED | OUTPATIENT
Start: 2019-01-01 | End: 2019-01-01 | Stop reason: HOSPADM

## 2019-01-01 RX ORDER — FENTANYL CITRATE 50 UG/ML
25 INJECTION, SOLUTION INTRAMUSCULAR; INTRAVENOUS EVERY 5 MIN PRN
Status: DISCONTINUED | OUTPATIENT
Start: 2019-01-01 | End: 2019-01-01 | Stop reason: HOSPADM

## 2019-01-01 RX ORDER — GABAPENTIN 300 MG/1
300 CAPSULE ORAL 3 TIMES DAILY
Status: DISCONTINUED | OUTPATIENT
Start: 2019-01-01 | End: 2019-01-01 | Stop reason: HOSPADM

## 2019-01-01 RX ORDER — SODIUM CHLORIDE 0.9 % (FLUSH) 0.9 %
10 SYRINGE (ML) INJECTION
Status: DISCONTINUED | OUTPATIENT
Start: 2019-01-01 | End: 2019-01-01 | Stop reason: HOSPADM

## 2019-01-01 RX ORDER — 0.9 % SODIUM CHLORIDE 0.9 %
10 VIAL (ML) INJECTION
Status: COMPLETED | OUTPATIENT
Start: 2019-01-01 | End: 2019-01-01

## 2019-01-01 RX ORDER — SODIUM CHLORIDE 0.9 % (FLUSH) 0.9 %
10 SYRINGE (ML) INJECTION 2 TIMES DAILY
Status: DISCONTINUED | OUTPATIENT
Start: 2019-01-01 | End: 2019-01-01 | Stop reason: HOSPADM

## 2019-01-01 RX ORDER — PROPOFOL 10 MG/ML
10 INJECTION, EMULSION INTRAVENOUS
Status: DISCONTINUED | OUTPATIENT
Start: 2019-01-01 | End: 2019-01-01 | Stop reason: HOSPADM

## 2019-01-01 RX ORDER — RAMIPRIL 5 MG/1
5 CAPSULE ORAL DAILY
Status: DISCONTINUED | OUTPATIENT
Start: 2019-01-01 | End: 2019-01-01 | Stop reason: HOSPADM

## 2019-01-01 RX ORDER — ACETAMINOPHEN 325 MG/1
TABLET ORAL
Status: COMPLETED
Start: 2019-01-01 | End: 2019-01-01

## 2019-01-01 RX ORDER — NICOTINE POLACRILEX 4 MG
15 LOZENGE BUCCAL PRN
Status: DISCONTINUED | OUTPATIENT
Start: 2019-01-01 | End: 2019-01-01 | Stop reason: HOSPADM

## 2019-01-01 RX ORDER — FENTANYL CITRATE 50 UG/ML
50 INJECTION, SOLUTION INTRAMUSCULAR; INTRAVENOUS ONCE
Status: COMPLETED | OUTPATIENT
Start: 2019-01-01 | End: 2019-01-01

## 2019-01-01 RX ORDER — POLYETHYLENE GLYCOL 3350 17 G/17G
17 POWDER, FOR SOLUTION ORAL DAILY
Status: DISCONTINUED | OUTPATIENT
Start: 2019-01-01 | End: 2019-01-01 | Stop reason: HOSPADM

## 2019-01-01 RX ORDER — SODIUM CHLORIDE 9 MG/ML
INJECTION, SOLUTION INTRAVENOUS CONTINUOUS
Status: DISCONTINUED | OUTPATIENT
Start: 2019-01-01 | End: 2019-01-01 | Stop reason: HOSPADM

## 2019-01-01 RX ORDER — MORPHINE SULFATE 4 MG/ML
4 INJECTION, SOLUTION INTRAMUSCULAR; INTRAVENOUS ONCE
Status: COMPLETED | OUTPATIENT
Start: 2019-01-01 | End: 2019-01-01

## 2019-01-01 RX ORDER — CLONIDINE HYDROCHLORIDE 0.1 MG/1
0.1 TABLET ORAL EVERY 6 HOURS PRN
Status: DISCONTINUED | OUTPATIENT
Start: 2019-01-01 | End: 2019-01-01 | Stop reason: HOSPADM

## 2019-01-01 RX ORDER — ACETAMINOPHEN 325 MG/1
650 TABLET ORAL EVERY 6 HOURS PRN
Status: DISCONTINUED | OUTPATIENT
Start: 2019-01-01 | End: 2019-01-01 | Stop reason: HOSPADM

## 2019-01-01 RX ORDER — MIDAZOLAM HYDROCHLORIDE 1 MG/ML
1 INJECTION INTRAMUSCULAR; INTRAVENOUS ONCE
Status: COMPLETED | OUTPATIENT
Start: 2019-01-01 | End: 2019-01-01

## 2019-01-01 RX ORDER — ACETAMINOPHEN 325 MG/1
650 TABLET ORAL EVERY 4 HOURS PRN
Status: DISCONTINUED | OUTPATIENT
Start: 2019-01-01 | End: 2019-01-01 | Stop reason: HOSPADM

## 2019-01-01 RX ORDER — DEXTROSE MONOHYDRATE 50 MG/ML
100 INJECTION, SOLUTION INTRAVENOUS PRN
Status: DISCONTINUED | OUTPATIENT
Start: 2019-01-01 | End: 2019-01-01 | Stop reason: HOSPADM

## 2019-01-01 RX ORDER — IPRATROPIUM BROMIDE AND ALBUTEROL SULFATE 2.5; .5 MG/3ML; MG/3ML
1 SOLUTION RESPIRATORY (INHALATION) EVERY 4 HOURS PRN
Status: DISCONTINUED | OUTPATIENT
Start: 2019-01-01 | End: 2019-01-01 | Stop reason: HOSPADM

## 2019-01-01 RX ORDER — RAMIPRIL 2.5 MG/1
5 CAPSULE ORAL DAILY
Status: DISCONTINUED | OUTPATIENT
Start: 2019-01-01 | End: 2019-01-01 | Stop reason: HOSPADM

## 2019-01-01 RX ORDER — FENTANYL CITRATE 50 UG/ML
INJECTION, SOLUTION INTRAMUSCULAR; INTRAVENOUS PRN
Status: DISCONTINUED | OUTPATIENT
Start: 2019-01-01 | End: 2019-01-01 | Stop reason: SDUPTHER

## 2019-01-01 RX ORDER — LABETALOL 20 MG/4 ML (5 MG/ML) INTRAVENOUS SYRINGE
5 EVERY 10 MIN PRN
Status: DISCONTINUED | OUTPATIENT
Start: 2019-01-01 | End: 2019-01-01 | Stop reason: HOSPADM

## 2019-01-01 RX ORDER — METOPROLOL SUCCINATE 50 MG/1
50 TABLET, EXTENDED RELEASE ORAL DAILY
Status: DISCONTINUED | OUTPATIENT
Start: 2019-01-01 | End: 2019-01-01 | Stop reason: HOSPADM

## 2019-01-01 RX ORDER — CEFUROXIME AXETIL 250 MG/1
250 TABLET ORAL 2 TIMES DAILY
Qty: 20 TABLET | Refills: 0 | Status: SHIPPED | OUTPATIENT
Start: 2019-01-01 | End: 2019-01-01

## 2019-01-01 RX ORDER — IPRATROPIUM BROMIDE AND ALBUTEROL SULFATE 2.5; .5 MG/3ML; MG/3ML
1 SOLUTION RESPIRATORY (INHALATION)
Status: DISCONTINUED | OUTPATIENT
Start: 2019-01-01 | End: 2019-01-01

## 2019-01-01 RX ORDER — CEFAZOLIN SODIUM 1 G/50ML
1 INJECTION, SOLUTION INTRAVENOUS ONCE
Status: COMPLETED | OUTPATIENT
Start: 2019-01-01 | End: 2019-01-01

## 2019-01-01 RX ORDER — HYDROMORPHONE HCL 110MG/55ML
PATIENT CONTROLLED ANALGESIA SYRINGE INTRAVENOUS
Status: COMPLETED
Start: 2019-01-01 | End: 2019-01-01

## 2019-01-01 RX ORDER — FUROSEMIDE 40 MG/1
20 TABLET ORAL DAILY
Qty: 60 TABLET | Refills: 3 | Status: SHIPPED | OUTPATIENT
Start: 2019-01-01

## 2019-01-01 RX ORDER — PANTOPRAZOLE SODIUM 40 MG/1
40 TABLET, DELAYED RELEASE ORAL
Status: DISCONTINUED | OUTPATIENT
Start: 2019-01-01 | End: 2019-01-01 | Stop reason: HOSPADM

## 2019-01-01 RX ORDER — INSULIN GLARGINE 100 [IU]/ML
30 INJECTION, SOLUTION SUBCUTANEOUS 2 TIMES DAILY
Status: DISCONTINUED | OUTPATIENT
Start: 2019-01-01 | End: 2019-01-01

## 2019-01-01 RX ORDER — HYDROMORPHONE HCL 110MG/55ML
0.5 PATIENT CONTROLLED ANALGESIA SYRINGE INTRAVENOUS EVERY 5 MIN PRN
Status: DISCONTINUED | OUTPATIENT
Start: 2019-01-01 | End: 2019-01-01 | Stop reason: HOSPADM

## 2019-01-01 RX ORDER — LORAZEPAM 0.5 MG/1
0.5 TABLET ORAL EVERY 4 HOURS PRN
Status: DISCONTINUED | OUTPATIENT
Start: 2019-01-01 | End: 2019-01-01 | Stop reason: HOSPADM

## 2019-01-01 RX ORDER — SODIUM CHLORIDE 0.9 % (FLUSH) 0.9 %
10 SYRINGE (ML) INJECTION PRN
Status: DISCONTINUED | OUTPATIENT
Start: 2019-01-01 | End: 2019-01-01 | Stop reason: HOSPADM

## 2019-01-01 RX ORDER — POTASSIUM CHLORIDE 1.5 G/1.77G
40 POWDER, FOR SOLUTION ORAL PRN
Status: DISCONTINUED | OUTPATIENT
Start: 2019-01-01 | End: 2019-01-01 | Stop reason: HOSPADM

## 2019-01-01 RX ORDER — POTASSIUM CHLORIDE 7.45 MG/ML
10 INJECTION INTRAVENOUS PRN
Status: DISCONTINUED | OUTPATIENT
Start: 2019-01-01 | End: 2019-01-01 | Stop reason: HOSPADM

## 2019-01-01 RX ORDER — INSULIN GLARGINE 100 [IU]/ML
20 INJECTION, SOLUTION SUBCUTANEOUS 2 TIMES DAILY
Status: DISCONTINUED | OUTPATIENT
Start: 2019-01-01 | End: 2019-01-01 | Stop reason: HOSPADM

## 2019-01-01 RX ORDER — FUROSEMIDE 40 MG/1
40 TABLET ORAL DAILY
Status: DISCONTINUED | OUTPATIENT
Start: 2019-01-01 | End: 2019-01-01 | Stop reason: HOSPADM

## 2019-01-01 RX ORDER — MORPHINE SULFATE 2 MG/ML
2 INJECTION, SOLUTION INTRAMUSCULAR; INTRAVENOUS EVERY 5 MIN PRN
Status: DISCONTINUED | OUTPATIENT
Start: 2019-01-01 | End: 2019-01-01 | Stop reason: HOSPADM

## 2019-01-01 RX ORDER — FUROSEMIDE 20 MG/1
20 TABLET ORAL DAILY
Status: DISCONTINUED | OUTPATIENT
Start: 2019-01-01 | End: 2019-01-01 | Stop reason: HOSPADM

## 2019-01-01 RX ORDER — LIDOCAINE HYDROCHLORIDE 20 MG/ML
INJECTION, SOLUTION INTRAVENOUS PRN
Status: DISCONTINUED | OUTPATIENT
Start: 2019-01-01 | End: 2019-01-01 | Stop reason: SDUPTHER

## 2019-01-01 RX ORDER — ROCURONIUM BROMIDE 10 MG/ML
INJECTION, SOLUTION INTRAVENOUS PRN
Status: DISCONTINUED | OUTPATIENT
Start: 2019-01-01 | End: 2019-01-01 | Stop reason: SDUPTHER

## 2019-01-01 RX ORDER — POTASSIUM CHLORIDE 20 MEQ/1
40 TABLET, EXTENDED RELEASE ORAL PRN
Status: DISCONTINUED | OUTPATIENT
Start: 2019-01-01 | End: 2019-01-01 | Stop reason: HOSPADM

## 2019-01-01 RX ORDER — PROCHLORPERAZINE EDISYLATE 5 MG/ML
10 INJECTION INTRAMUSCULAR; INTRAVENOUS EVERY 6 HOURS PRN
Status: DISCONTINUED | OUTPATIENT
Start: 2019-01-01 | End: 2019-01-01 | Stop reason: HOSPADM

## 2019-01-01 RX ORDER — 0.9 % SODIUM CHLORIDE 0.9 %
250 INTRAVENOUS SOLUTION INTRAVENOUS ONCE
Status: DISCONTINUED | OUTPATIENT
Start: 2019-01-01 | End: 2019-01-01 | Stop reason: HOSPADM

## 2019-01-01 RX ADMIN — LATANOPROST 1 DROP: 50 SOLUTION/ DROPS OPHTHALMIC at 21:46

## 2019-01-01 RX ADMIN — FENTANYL CITRATE 50 MCG: 50 INJECTION INTRAMUSCULAR; INTRAVENOUS at 16:19

## 2019-01-01 RX ADMIN — GABAPENTIN 300 MG: 300 CAPSULE ORAL at 21:17

## 2019-01-01 RX ADMIN — MIRTAZAPINE 7.5 MG: 15 TABLET, FILM COATED ORAL at 21:28

## 2019-01-01 RX ADMIN — PHENYLEPHRINE HYDROCHLORIDE 100 MCG: 10 INJECTION INTRAVENOUS at 20:04

## 2019-01-01 RX ADMIN — CLONIDINE HYDROCHLORIDE 0.1 MG: 0.1 TABLET ORAL at 06:43

## 2019-01-01 RX ADMIN — GABAPENTIN 300 MG: 300 CAPSULE ORAL at 08:29

## 2019-01-01 RX ADMIN — AMLODIPINE BESYLATE 10 MG: 10 TABLET ORAL at 08:08

## 2019-01-01 RX ADMIN — METOPROLOL SUCCINATE 50 MG: 50 TABLET, EXTENDED RELEASE ORAL at 09:13

## 2019-01-01 RX ADMIN — POTASSIUM CHLORIDE 40 MEQ: 20 TABLET, EXTENDED RELEASE ORAL at 11:56

## 2019-01-01 RX ADMIN — Medication 1 MG: at 14:51

## 2019-01-01 RX ADMIN — CEFTRIAXONE 1 G: 1 INJECTION, POWDER, FOR SOLUTION INTRAMUSCULAR; INTRAVENOUS at 08:04

## 2019-01-01 RX ADMIN — GABAPENTIN 300 MG: 300 CAPSULE ORAL at 08:04

## 2019-01-01 RX ADMIN — MORPHINE SULFATE 4 MG: 4 INJECTION, SOLUTION INTRAMUSCULAR; INTRAVENOUS at 21:06

## 2019-01-01 RX ADMIN — LATANOPROST 1 DROP: 50 SOLUTION/ DROPS OPHTHALMIC at 20:53

## 2019-01-01 RX ADMIN — PROCHLORPERAZINE EDISYLATE 10 MG: 5 INJECTION INTRAMUSCULAR; INTRAVENOUS at 23:59

## 2019-01-01 RX ADMIN — SODIUM CHLORIDE: 9 INJECTION, SOLUTION INTRAVENOUS at 21:43

## 2019-01-01 RX ADMIN — PHENYLEPHRINE HYDROCHLORIDE 100 MCG: 10 INJECTION INTRAVENOUS at 19:42

## 2019-01-01 RX ADMIN — MIRTAZAPINE 7.5 MG: 15 TABLET, FILM COATED ORAL at 22:28

## 2019-01-01 RX ADMIN — INSULIN GLARGINE 30 UNITS: 100 INJECTION, SOLUTION SUBCUTANEOUS at 22:29

## 2019-01-01 RX ADMIN — POLYETHYLENE GLYCOL (3350) 17 G: 17 POWDER, FOR SOLUTION ORAL at 08:29

## 2019-01-01 RX ADMIN — Medication 16 MG: at 08:31

## 2019-01-01 RX ADMIN — CLONIDINE HYDROCHLORIDE 0.1 MG: 0.1 TABLET ORAL at 05:55

## 2019-01-01 RX ADMIN — FUROSEMIDE 40 MG: 40 TABLET ORAL at 18:46

## 2019-01-01 RX ADMIN — INSULIN LISPRO 1 UNITS: 100 INJECTION, SOLUTION INTRAVENOUS; SUBCUTANEOUS at 13:32

## 2019-01-01 RX ADMIN — INSULIN LISPRO 2 UNITS: 100 INJECTION, SOLUTION INTRAVENOUS; SUBCUTANEOUS at 22:29

## 2019-01-01 RX ADMIN — INSULIN LISPRO 2 UNITS: 100 INJECTION, SOLUTION INTRAVENOUS; SUBCUTANEOUS at 18:00

## 2019-01-01 RX ADMIN — INSULIN LISPRO 6 UNITS: 100 INJECTION, SOLUTION INTRAVENOUS; SUBCUTANEOUS at 09:30

## 2019-01-01 RX ADMIN — CLONIDINE HYDROCHLORIDE 0.1 MG: 0.1 TABLET ORAL at 17:02

## 2019-01-01 RX ADMIN — SODIUM CHLORIDE, PRESERVATIVE FREE 10 ML: 5 INJECTION INTRAVENOUS at 08:08

## 2019-01-01 RX ADMIN — ACETAMINOPHEN 650 MG: 325 TABLET ORAL at 16:04

## 2019-01-01 RX ADMIN — FUROSEMIDE 40 MG: 40 TABLET ORAL at 09:01

## 2019-01-01 RX ADMIN — METOPROLOL SUCCINATE 50 MG: 50 TABLET, EXTENDED RELEASE ORAL at 18:46

## 2019-01-01 RX ADMIN — GABAPENTIN 300 MG: 300 CAPSULE ORAL at 09:04

## 2019-01-01 RX ADMIN — LATANOPROST 1 DROP: 50 SOLUTION/ DROPS OPHTHALMIC at 21:28

## 2019-01-01 RX ADMIN — GABAPENTIN 300 MG: 300 CAPSULE ORAL at 10:07

## 2019-01-01 RX ADMIN — SODIUM CHLORIDE: 9 INJECTION, SOLUTION INTRAVENOUS at 22:36

## 2019-01-01 RX ADMIN — FUROSEMIDE 40 MG: 40 TABLET ORAL at 08:28

## 2019-01-01 RX ADMIN — CHLORHEXIDINE GLUCONATE 0.12% ORAL RINSE 15 ML: 1.2 LIQUID ORAL at 07:56

## 2019-01-01 RX ADMIN — ENOXAPARIN SODIUM 30 MG: 30 INJECTION SUBCUTANEOUS at 15:20

## 2019-01-01 RX ADMIN — HYDRALAZINE HYDROCHLORIDE 10 MG: 20 INJECTION INTRAMUSCULAR; INTRAVENOUS at 01:22

## 2019-01-01 RX ADMIN — FENTANYL CITRATE 50 MCG: 50 INJECTION INTRAMUSCULAR; INTRAVENOUS at 20:20

## 2019-01-01 RX ADMIN — AMLODIPINE BESYLATE 10 MG: 10 TABLET ORAL at 08:28

## 2019-01-01 RX ADMIN — FENTANYL CITRATE 50 MCG: 50 INJECTION INTRAMUSCULAR; INTRAVENOUS at 19:15

## 2019-01-01 RX ADMIN — GABAPENTIN 300 MG: 300 CAPSULE ORAL at 20:38

## 2019-01-01 RX ADMIN — CLONIDINE HYDROCHLORIDE 0.1 MG: 0.1 TABLET ORAL at 21:43

## 2019-01-01 RX ADMIN — ACETAMINOPHEN 650 MG: 325 TABLET ORAL at 16:57

## 2019-01-01 RX ADMIN — AMLODIPINE BESYLATE 10 MG: 5 TABLET ORAL at 10:06

## 2019-01-01 RX ADMIN — PHENYLEPHRINE HYDROCHLORIDE 100 MCG: 10 INJECTION INTRAVENOUS at 20:10

## 2019-01-01 RX ADMIN — HYDROCODONE BITARTRATE AND ACETAMINOPHEN 1 TABLET: 5; 325 TABLET ORAL at 14:19

## 2019-01-01 RX ADMIN — RAMIPRIL 5 MG: 2.5 CAPSULE ORAL at 09:03

## 2019-01-01 RX ADMIN — RAMIPRIL 5 MG: 2.5 CAPSULE ORAL at 12:07

## 2019-01-01 RX ADMIN — GABAPENTIN 300 MG: 300 CAPSULE ORAL at 13:12

## 2019-01-01 RX ADMIN — MORPHINE SULFATE 2 MG: 4 INJECTION, SOLUTION INTRAMUSCULAR; INTRAVENOUS at 01:22

## 2019-01-01 RX ADMIN — SODIUM CHLORIDE, PRESERVATIVE FREE 10 ML: 5 INJECTION INTRAVENOUS at 19:50

## 2019-01-01 RX ADMIN — PROPOFOL 20 MCG/KG/MIN: 10 INJECTION, EMULSION INTRAVENOUS at 20:45

## 2019-01-01 RX ADMIN — IPRATROPIUM BROMIDE AND ALBUTEROL SULFATE 1 AMPULE: .5; 3 SOLUTION RESPIRATORY (INHALATION) at 21:02

## 2019-01-01 RX ADMIN — INSULIN LISPRO 3 UNITS: 100 INJECTION, SOLUTION INTRAVENOUS; SUBCUTANEOUS at 17:28

## 2019-01-01 RX ADMIN — MIRTAZAPINE 7.5 MG: 15 TABLET, FILM COATED ORAL at 20:38

## 2019-01-01 RX ADMIN — PHENYLEPHRINE HYDROCHLORIDE 300 MCG: 10 INJECTION INTRAVENOUS at 19:22

## 2019-01-01 RX ADMIN — SODIUM CHLORIDE, PRESERVATIVE FREE 10 ML: 5 INJECTION INTRAVENOUS at 09:01

## 2019-01-01 RX ADMIN — ACETAMINOPHEN 650 MG: 325 TABLET ORAL at 11:03

## 2019-01-01 RX ADMIN — INSULIN GLARGINE 20 UNITS: 100 INJECTION, SOLUTION SUBCUTANEOUS at 21:18

## 2019-01-01 RX ADMIN — INSULIN LISPRO 1 UNITS: 100 INJECTION, SOLUTION INTRAVENOUS; SUBCUTANEOUS at 17:37

## 2019-01-01 RX ADMIN — AMLODIPINE BESYLATE 10 MG: 10 TABLET ORAL at 08:04

## 2019-01-01 RX ADMIN — INSULIN DETEMIR 20 UNITS: 100 INJECTION, SOLUTION SUBCUTANEOUS at 10:15

## 2019-01-01 RX ADMIN — ONDANSETRON 4 MG: 2 INJECTION INTRAMUSCULAR; INTRAVENOUS at 19:12

## 2019-01-01 RX ADMIN — PHENYLEPHRINE HYDROCHLORIDE 100 MCG: 10 INJECTION INTRAVENOUS at 19:24

## 2019-01-01 RX ADMIN — HYDROCODONE BITARTRATE AND ACETAMINOPHEN 1 TABLET: 5; 325 TABLET ORAL at 06:48

## 2019-01-01 RX ADMIN — PHENYLEPHRINE HYDROCHLORIDE 100 MCG: 10 INJECTION INTRAVENOUS at 19:35

## 2019-01-01 RX ADMIN — FUROSEMIDE 40 MG: 40 TABLET ORAL at 08:04

## 2019-01-01 RX ADMIN — INSULIN GLARGINE 30 UNITS: 100 INJECTION, SOLUTION SUBCUTANEOUS at 22:04

## 2019-01-01 RX ADMIN — IPRATROPIUM BROMIDE AND ALBUTEROL SULFATE 1 AMPULE: .5; 3 SOLUTION RESPIRATORY (INHALATION) at 12:28

## 2019-01-01 RX ADMIN — INSULIN LISPRO 2 UNITS: 100 INJECTION, SOLUTION INTRAVENOUS; SUBCUTANEOUS at 12:56

## 2019-01-01 RX ADMIN — SODIUM CHLORIDE, PRESERVATIVE FREE 10 ML: 5 INJECTION INTRAVENOUS at 23:13

## 2019-01-01 RX ADMIN — CEFTRIAXONE 1 G: 1 INJECTION, POWDER, FOR SOLUTION INTRAMUSCULAR; INTRAVENOUS at 09:13

## 2019-01-01 RX ADMIN — METOPROLOL SUCCINATE 50 MG: 50 TABLET, EXTENDED RELEASE ORAL at 09:01

## 2019-01-01 RX ADMIN — GABAPENTIN 300 MG: 300 CAPSULE ORAL at 09:13

## 2019-01-01 RX ADMIN — METOPROLOL SUCCINATE 50 MG: 50 TABLET, EXTENDED RELEASE ORAL at 08:04

## 2019-01-01 RX ADMIN — METOPROLOL SUCCINATE 50 MG: 50 TABLET, EXTENDED RELEASE ORAL at 09:10

## 2019-01-01 RX ADMIN — CEFTRIAXONE 1 G: 1 INJECTION, POWDER, FOR SOLUTION INTRAMUSCULAR; INTRAVENOUS at 08:29

## 2019-01-01 RX ADMIN — GABAPENTIN 300 MG: 300 CAPSULE ORAL at 13:16

## 2019-01-01 RX ADMIN — HYDROCODONE BITARTRATE AND ACETAMINOPHEN 1 TABLET: 5; 325 TABLET ORAL at 20:37

## 2019-01-01 RX ADMIN — GABAPENTIN 300 MG: 300 CAPSULE ORAL at 21:28

## 2019-01-01 RX ADMIN — SODIUM CHLORIDE, PRESERVATIVE FREE 10 ML: 5 INJECTION INTRAVENOUS at 22:27

## 2019-01-01 RX ADMIN — METOPROLOL SUCCINATE 50 MG: 50 TABLET, EXTENDED RELEASE ORAL at 10:07

## 2019-01-01 RX ADMIN — RAMIPRIL 5 MG: 5 CAPSULE ORAL at 10:15

## 2019-01-01 RX ADMIN — IPRATROPIUM BROMIDE AND ALBUTEROL SULFATE 1 AMPULE: .5; 3 SOLUTION RESPIRATORY (INHALATION) at 08:21

## 2019-01-01 RX ADMIN — PHENYLEPHRINE HYDROCHLORIDE 200 MCG: 10 INJECTION INTRAVENOUS at 19:41

## 2019-01-01 RX ADMIN — LATANOPROST 1 DROP: 50 SOLUTION/ DROPS OPHTHALMIC at 22:29

## 2019-01-01 RX ADMIN — ONDANSETRON 4 MG: 2 INJECTION INTRAMUSCULAR; INTRAVENOUS at 21:13

## 2019-01-01 RX ADMIN — PHENYLEPHRINE HYDROCHLORIDE 200 MCG: 10 INJECTION INTRAVENOUS at 20:20

## 2019-01-01 RX ADMIN — Medication 15 G: at 09:26

## 2019-01-01 RX ADMIN — PANTOPRAZOLE SODIUM 40 MG: 40 TABLET, DELAYED RELEASE ORAL at 06:35

## 2019-01-01 RX ADMIN — POLYETHYLENE GLYCOL (3350) 17 G: 17 POWDER, FOR SOLUTION ORAL at 11:05

## 2019-01-01 RX ADMIN — CEFTRIAXONE 1 G: 1 INJECTION, POWDER, FOR SOLUTION INTRAMUSCULAR; INTRAVENOUS at 08:08

## 2019-01-01 RX ADMIN — AMLODIPINE BESYLATE 10 MG: 10 TABLET ORAL at 18:46

## 2019-01-01 RX ADMIN — PHENYLEPHRINE HYDROCHLORIDE 200 MCG: 10 INJECTION INTRAVENOUS at 19:50

## 2019-01-01 RX ADMIN — PHENYLEPHRINE HYDROCHLORIDE 200 MCG: 10 INJECTION INTRAVENOUS at 20:19

## 2019-01-01 RX ADMIN — INSULIN LISPRO 2 UNITS: 100 INJECTION, SOLUTION INTRAVENOUS; SUBCUTANEOUS at 17:50

## 2019-01-01 RX ADMIN — ENOXAPARIN SODIUM 30 MG: 30 INJECTION SUBCUTANEOUS at 14:32

## 2019-01-01 RX ADMIN — CLONIDINE HYDROCHLORIDE 0.1 MG: 0.1 TABLET ORAL at 05:25

## 2019-01-01 RX ADMIN — PHENYLEPHRINE HYDROCHLORIDE 100 MCG/MIN: 10 INJECTION INTRAVENOUS at 06:02

## 2019-01-01 RX ADMIN — MIDAZOLAM HYDROCHLORIDE 1 MG: 1 INJECTION, SOLUTION INTRAMUSCULAR; INTRAVENOUS at 10:38

## 2019-01-01 RX ADMIN — RAMIPRIL 5 MG: 2.5 CAPSULE ORAL at 13:16

## 2019-01-01 RX ADMIN — GABAPENTIN 300 MG: 300 CAPSULE ORAL at 14:32

## 2019-01-01 RX ADMIN — RAMIPRIL 5 MG: 2.5 CAPSULE ORAL at 09:01

## 2019-01-01 RX ADMIN — SODIUM CHLORIDE: 9 INJECTION, SOLUTION INTRAVENOUS at 00:00

## 2019-01-01 RX ADMIN — PHENYLEPHRINE HYDROCHLORIDE 200 MCG: 10 INJECTION INTRAVENOUS at 20:18

## 2019-01-01 RX ADMIN — FENTANYL CITRATE 50 MCG: 50 INJECTION INTRAMUSCULAR; INTRAVENOUS at 10:37

## 2019-01-01 RX ADMIN — GABAPENTIN 300 MG: 300 CAPSULE ORAL at 14:15

## 2019-01-01 RX ADMIN — NOREPINEPHRINE BITARTRATE 10 MCG/MIN: 1 INJECTION INTRAVENOUS at 20:45

## 2019-01-01 RX ADMIN — Medication 10 ML: at 12:11

## 2019-01-01 RX ADMIN — PHENYLEPHRINE HYDROCHLORIDE 200 MCG: 10 INJECTION INTRAVENOUS at 19:40

## 2019-01-01 RX ADMIN — GABAPENTIN 300 MG: 300 CAPSULE ORAL at 09:10

## 2019-01-01 RX ADMIN — INSULIN LISPRO 1 UNITS: 100 INJECTION, SOLUTION INTRAVENOUS; SUBCUTANEOUS at 09:02

## 2019-01-01 RX ADMIN — Medication 15 G: at 10:21

## 2019-01-01 RX ADMIN — Medication 15 G: at 09:27

## 2019-01-01 RX ADMIN — INSULIN LISPRO 1 UNITS: 100 INJECTION, SOLUTION INTRAVENOUS; SUBCUTANEOUS at 21:28

## 2019-01-01 RX ADMIN — ONDANSETRON 4 MG: 2 INJECTION INTRAMUSCULAR; INTRAVENOUS at 05:29

## 2019-01-01 RX ADMIN — INSULIN LISPRO 5 UNITS: 100 INJECTION, SOLUTION INTRAVENOUS; SUBCUTANEOUS at 09:38

## 2019-01-01 RX ADMIN — CEFAZOLIN SODIUM 1 G: 1 INJECTION, SOLUTION INTRAVENOUS at 16:19

## 2019-01-01 RX ADMIN — ENOXAPARIN SODIUM 30 MG: 30 INJECTION SUBCUTANEOUS at 16:51

## 2019-01-01 RX ADMIN — GABAPENTIN 300 MG: 300 CAPSULE ORAL at 22:02

## 2019-01-01 RX ADMIN — PHENYLEPHRINE HYDROCHLORIDE 200 MCG: 10 INJECTION INTRAVENOUS at 20:13

## 2019-01-01 RX ADMIN — AMLODIPINE BESYLATE 10 MG: 10 TABLET ORAL at 09:01

## 2019-01-01 RX ADMIN — PROPOFOL 80 MG: 10 INJECTION, EMULSION INTRAVENOUS at 19:15

## 2019-01-01 RX ADMIN — GABAPENTIN 300 MG: 300 CAPSULE ORAL at 14:11

## 2019-01-01 RX ADMIN — INSULIN LISPRO 1 UNITS: 100 INJECTION, SOLUTION INTRAVENOUS; SUBCUTANEOUS at 21:18

## 2019-01-01 RX ADMIN — METOPROLOL SUCCINATE 50 MG: 50 TABLET, EXTENDED RELEASE ORAL at 08:08

## 2019-01-01 RX ADMIN — SODIUM CHLORIDE, PRESERVATIVE FREE 10 ML: 5 INJECTION INTRAVENOUS at 21:46

## 2019-01-01 RX ADMIN — ENOXAPARIN SODIUM 30 MG: 30 INJECTION SUBCUTANEOUS at 15:57

## 2019-01-01 RX ADMIN — INSULIN LISPRO 3 UNITS: 100 INJECTION, SOLUTION INTRAVENOUS; SUBCUTANEOUS at 13:14

## 2019-01-01 RX ADMIN — METOPROLOL SUCCINATE 50 MG: 50 TABLET, EXTENDED RELEASE ORAL at 09:04

## 2019-01-01 RX ADMIN — INSULIN GLARGINE 20 UNITS: 100 INJECTION, SOLUTION SUBCUTANEOUS at 09:01

## 2019-01-01 RX ADMIN — SODIUM CHLORIDE: 9 INJECTION, SOLUTION INTRAVENOUS at 14:13

## 2019-01-01 RX ADMIN — HYDROMORPHONE HYDROCHLORIDE 1 MG: 2 INJECTION, SOLUTION INTRAMUSCULAR; INTRAVENOUS; SUBCUTANEOUS at 14:51

## 2019-01-01 RX ADMIN — SODIUM CHLORIDE, PRESERVATIVE FREE 10 ML: 5 INJECTION INTRAVENOUS at 08:29

## 2019-01-01 RX ADMIN — MIDAZOLAM HYDROCHLORIDE 1 MG: 1 INJECTION, SOLUTION INTRAMUSCULAR; INTRAVENOUS at 16:20

## 2019-01-01 RX ADMIN — ENOXAPARIN SODIUM 40 MG: 40 INJECTION SUBCUTANEOUS at 10:05

## 2019-01-01 RX ADMIN — FUROSEMIDE 40 MG: 40 TABLET ORAL at 09:04

## 2019-01-01 RX ADMIN — RAMIPRIL 5 MG: 2.5 CAPSULE ORAL at 09:13

## 2019-01-01 RX ADMIN — PANTOPRAZOLE SODIUM 40 MG: 40 TABLET, DELAYED RELEASE ORAL at 05:55

## 2019-01-01 RX ADMIN — FUROSEMIDE 40 MG: 40 TABLET ORAL at 09:13

## 2019-01-01 RX ADMIN — PHENYLEPHRINE HYDROCHLORIDE 300 MCG: 10 INJECTION INTRAVENOUS at 19:20

## 2019-01-01 RX ADMIN — Medication 25 MCG/HR: at 21:44

## 2019-01-01 RX ADMIN — IPRATROPIUM BROMIDE AND ALBUTEROL SULFATE 1 AMPULE: .5; 3 SOLUTION RESPIRATORY (INHALATION) at 16:04

## 2019-01-01 RX ADMIN — ENOXAPARIN SODIUM 30 MG: 30 INJECTION SUBCUTANEOUS at 15:39

## 2019-01-01 RX ADMIN — GADOBENATE DIMEGLUMINE 6 ML: 529 INJECTION, SOLUTION INTRAVENOUS at 08:35

## 2019-01-01 RX ADMIN — ENOXAPARIN SODIUM 30 MG: 30 INJECTION SUBCUTANEOUS at 13:16

## 2019-01-01 RX ADMIN — FUROSEMIDE 40 MG: 40 TABLET ORAL at 09:10

## 2019-01-01 RX ADMIN — SODIUM CHLORIDE: 9 INJECTION, SOLUTION INTRAVENOUS at 18:30

## 2019-01-01 RX ADMIN — MIRTAZAPINE 7.5 MG: 15 TABLET, FILM COATED ORAL at 23:12

## 2019-01-01 RX ADMIN — LIDOCAINE HYDROCHLORIDE 100 MG: 20 INJECTION, SOLUTION INTRAVENOUS at 19:15

## 2019-01-01 RX ADMIN — INSULIN GLARGINE 20 UNITS: 100 INJECTION, SOLUTION SUBCUTANEOUS at 20:53

## 2019-01-01 RX ADMIN — IPRATROPIUM BROMIDE AND ALBUTEROL SULFATE 1 AMPULE: .5; 3 SOLUTION RESPIRATORY (INHALATION) at 08:17

## 2019-01-01 RX ADMIN — PHENYLEPHRINE HYDROCHLORIDE 100 MCG: 10 INJECTION INTRAVENOUS at 19:55

## 2019-01-01 RX ADMIN — INSULIN HUMAN 10 UNITS: 100 INJECTION, SOLUTION PARENTERAL at 05:29

## 2019-01-01 RX ADMIN — Medication 15 G: at 09:00

## 2019-01-01 RX ADMIN — RAMIPRIL 5 MG: 2.5 CAPSULE ORAL at 11:15

## 2019-01-01 RX ADMIN — HYOSCYAMINE SULFATE 125 MCG: 0.12 TABLET, ORALLY DISINTEGRATING ORAL at 19:12

## 2019-01-01 RX ADMIN — SODIUM CHLORIDE 1000 ML: 9 INJECTION, SOLUTION INTRAVENOUS at 10:46

## 2019-01-01 RX ADMIN — CEFTRIAXONE 1 G: 1 INJECTION, POWDER, FOR SOLUTION INTRAMUSCULAR; INTRAVENOUS at 09:01

## 2019-01-01 RX ADMIN — INSULIN GLARGINE 30 UNITS: 100 INJECTION, SOLUTION SUBCUTANEOUS at 11:05

## 2019-01-01 RX ADMIN — INSULIN LISPRO 4 UNITS: 100 INJECTION, SOLUTION INTRAVENOUS; SUBCUTANEOUS at 12:59

## 2019-01-01 RX ADMIN — METOPROLOL SUCCINATE 50 MG: 50 TABLET, EXTENDED RELEASE ORAL at 08:28

## 2019-01-01 RX ADMIN — MORPHINE SULFATE 4 MG: 4 INJECTION INTRAVENOUS at 19:12

## 2019-01-01 RX ADMIN — INSULIN LISPRO 3 UNITS: 100 INJECTION, SOLUTION INTRAVENOUS; SUBCUTANEOUS at 18:11

## 2019-01-01 RX ADMIN — INSULIN LISPRO 1 UNITS: 100 INJECTION, SOLUTION INTRAVENOUS; SUBCUTANEOUS at 11:49

## 2019-01-01 RX ADMIN — LATANOPROST 1 DROP: 50 SOLUTION/ DROPS OPHTHALMIC at 22:03

## 2019-01-01 RX ADMIN — CEFTRIAXONE 1 G: 1 INJECTION, POWDER, FOR SOLUTION INTRAMUSCULAR; INTRAVENOUS at 09:10

## 2019-01-01 RX ADMIN — SODIUM CHLORIDE, PRESERVATIVE FREE 10 ML: 5 INJECTION INTRAVENOUS at 09:13

## 2019-01-01 RX ADMIN — INSULIN LISPRO 2 UNITS: 100 INJECTION, SOLUTION INTRAVENOUS; SUBCUTANEOUS at 17:45

## 2019-01-01 RX ADMIN — CEFTRIAXONE 1 G: 1 INJECTION, POWDER, FOR SOLUTION INTRAMUSCULAR; INTRAVENOUS at 09:03

## 2019-01-01 RX ADMIN — PANTOPRAZOLE SODIUM 40 MG: 40 TABLET, DELAYED RELEASE ORAL at 05:20

## 2019-01-01 RX ADMIN — PANTOPRAZOLE SODIUM 40 MG: 40 TABLET, DELAYED RELEASE ORAL at 06:28

## 2019-01-01 RX ADMIN — INSULIN GLARGINE 30 UNITS: 100 INJECTION, SOLUTION SUBCUTANEOUS at 10:05

## 2019-01-01 RX ADMIN — IOPAMIDOL 40 ML: 755 INJECTION, SOLUTION INTRAVENOUS at 16:34

## 2019-01-01 RX ADMIN — PHENYLEPHRINE HYDROCHLORIDE 300 MCG: 10 INJECTION INTRAVENOUS at 20:02

## 2019-01-01 RX ADMIN — Medication 100 MG: at 19:16

## 2019-01-01 RX ADMIN — INSULIN GLARGINE 30 UNITS: 100 INJECTION, SOLUTION SUBCUTANEOUS at 21:28

## 2019-01-01 RX ADMIN — AMLODIPINE BESYLATE 10 MG: 10 TABLET ORAL at 09:13

## 2019-01-01 RX ADMIN — Medication 15 G: at 08:56

## 2019-01-01 RX ADMIN — CEFAZOLIN SODIUM 2 G: 2 INJECTION, SOLUTION INTRAVENOUS at 19:26

## 2019-01-01 RX ADMIN — SODIUM CHLORIDE: 9 INJECTION, SOLUTION INTRAVENOUS at 05:29

## 2019-01-01 RX ADMIN — GABAPENTIN 300 MG: 300 CAPSULE ORAL at 22:28

## 2019-01-01 RX ADMIN — FUROSEMIDE 40 MG: 40 TABLET ORAL at 08:08

## 2019-01-01 RX ADMIN — ACETAMINOPHEN 650 MG: 325 TABLET ORAL at 10:41

## 2019-01-01 RX ADMIN — SODIUM CHLORIDE, PRESERVATIVE FREE 10 ML: 5 INJECTION INTRAVENOUS at 22:03

## 2019-01-01 RX ADMIN — SODIUM CHLORIDE, PRESERVATIVE FREE 10 ML: 5 INJECTION INTRAVENOUS at 09:11

## 2019-01-01 RX ADMIN — AMLODIPINE BESYLATE 10 MG: 10 TABLET ORAL at 09:04

## 2019-01-01 RX ADMIN — AMLODIPINE BESYLATE 10 MG: 10 TABLET ORAL at 09:10

## 2019-01-01 RX ADMIN — SODIUM CHLORIDE, PRESERVATIVE FREE 10 ML: 5 INJECTION INTRAVENOUS at 21:56

## 2019-01-01 RX ADMIN — POLYETHYLENE GLYCOL (3350) 17 G: 17 POWDER, FOR SOLUTION ORAL at 09:04

## 2019-01-01 RX ADMIN — RAMIPRIL 5 MG: 2.5 CAPSULE ORAL at 08:08

## 2019-01-01 RX ADMIN — INSULIN LISPRO 1 UNITS: 100 INJECTION, SOLUTION INTRAVENOUS; SUBCUTANEOUS at 23:14

## 2019-01-01 RX ADMIN — VASOPRESSIN 0.02 UNITS/MIN: 20 INJECTION INTRAVENOUS at 21:40

## 2019-01-01 RX ADMIN — GABAPENTIN 300 MG: 300 CAPSULE ORAL at 21:45

## 2019-01-01 RX ADMIN — MIRTAZAPINE 7.5 MG: 15 TABLET, FILM COATED ORAL at 22:02

## 2019-01-01 RX ADMIN — GABAPENTIN 300 MG: 300 CAPSULE ORAL at 08:08

## 2019-01-01 RX ADMIN — GABAPENTIN 300 MG: 300 CAPSULE ORAL at 15:39

## 2019-01-01 RX ADMIN — MIRTAZAPINE 7.5 MG: 15 TABLET, FILM COATED ORAL at 21:17

## 2019-01-01 RX ADMIN — LATANOPROST 1 DROP: 50 SOLUTION/ DROPS OPHTHALMIC at 23:13

## 2019-01-01 RX ADMIN — PHENYLEPHRINE HYDROCHLORIDE 100 MCG: 10 INJECTION INTRAVENOUS at 19:49

## 2019-01-01 RX ADMIN — INSULIN GLARGINE 20 UNITS: 100 INJECTION, SOLUTION SUBCUTANEOUS at 23:13

## 2019-01-01 RX ADMIN — PHENYLEPHRINE HYDROCHLORIDE 300 MCG: 10 INJECTION INTRAVENOUS at 19:38

## 2019-01-01 RX ADMIN — INSULIN LISPRO 5 UNITS: 100 INJECTION, SOLUTION INTRAVENOUS; SUBCUTANEOUS at 12:59

## 2019-01-01 RX ADMIN — GABAPENTIN 300 MG: 300 CAPSULE ORAL at 13:15

## 2019-01-01 RX ADMIN — TRAZODONE HYDROCHLORIDE 50 MG: 50 TABLET ORAL at 22:51

## 2019-01-01 RX ADMIN — PANTOPRAZOLE SODIUM 40 MG: 40 TABLET, DELAYED RELEASE ORAL at 06:41

## 2019-01-01 RX ADMIN — ONDANSETRON 4 MG: 2 INJECTION INTRAMUSCULAR; INTRAVENOUS at 12:58

## 2019-01-01 RX ADMIN — FUROSEMIDE 20 MG: 20 TABLET ORAL at 10:07

## 2019-01-01 RX ADMIN — ROCURONIUM BROMIDE 30 MG: 10 INJECTION INTRAVENOUS at 19:30

## 2019-01-01 RX ADMIN — GABAPENTIN 300 MG: 300 CAPSULE ORAL at 09:01

## 2019-01-01 RX ADMIN — HYDROCODONE BITARTRATE AND ACETAMINOPHEN 1 TABLET: 5; 325 TABLET ORAL at 15:20

## 2019-01-01 RX ADMIN — MIRTAZAPINE 7.5 MG: 15 TABLET, FILM COATED ORAL at 21:45

## 2019-01-01 RX ADMIN — IOPAMIDOL 75 ML: 755 INJECTION, SOLUTION INTRAVENOUS at 19:50

## 2019-01-01 RX ADMIN — SODIUM CHLORIDE 500 ML: 9 INJECTION, SOLUTION INTRAVENOUS at 19:11

## 2019-01-01 RX ADMIN — GABAPENTIN 300 MG: 300 CAPSULE ORAL at 16:44

## 2019-01-01 RX ADMIN — CEFTRIAXONE 1 G: 1 INJECTION, POWDER, FOR SOLUTION INTRAMUSCULAR; INTRAVENOUS at 22:29

## 2019-01-01 RX ADMIN — LATANOPROST 1 DROP: 50 SOLUTION/ DROPS OPHTHALMIC at 21:17

## 2019-01-01 RX ADMIN — SODIUM CHLORIDE, PRESERVATIVE FREE 10 ML: 5 INJECTION INTRAVENOUS at 09:04

## 2019-01-01 RX ADMIN — GABAPENTIN 300 MG: 300 CAPSULE ORAL at 23:13

## 2019-01-01 ASSESSMENT — PULMONARY FUNCTION TESTS
PIF_VALUE: 17
PIF_VALUE: 19
PIF_VALUE: 23
PIF_VALUE: 2
PIF_VALUE: 18
PIF_VALUE: 17
PIF_VALUE: 19
PIF_VALUE: 19
PIF_VALUE: 0
PIF_VALUE: 16
PIF_VALUE: 20
PIF_VALUE: 17
PIF_VALUE: 20
PIF_VALUE: 18
PIF_VALUE: 5
PIF_VALUE: 9
PIF_VALUE: 19
PIF_VALUE: 0
PIF_VALUE: 20
PIF_VALUE: 19
PIF_VALUE: 23
PIF_VALUE: 16
PIF_VALUE: 19
PIF_VALUE: 19
PIF_VALUE: 20
PIF_VALUE: 18
PIF_VALUE: 0
PIF_VALUE: 18
PIF_VALUE: 17
PIF_VALUE: 23
PIF_VALUE: 17
PIF_VALUE: 19
PIF_VALUE: 21
PIF_VALUE: 19
PIF_VALUE: 18
PIF_VALUE: 17
PIF_VALUE: 21
PIF_VALUE: 0
PIF_VALUE: 17
PIF_VALUE: 18
PIF_VALUE: 21
PIF_VALUE: 16
PIF_VALUE: 21
PIF_VALUE: 17
PIF_VALUE: 19
PIF_VALUE: 17
PIF_VALUE: 0
PIF_VALUE: 16
PIF_VALUE: 22
PIF_VALUE: 19
PIF_VALUE: 18
PIF_VALUE: 17
PIF_VALUE: 9
PIF_VALUE: 18
PIF_VALUE: 30
PIF_VALUE: 0
PIF_VALUE: 20
PIF_VALUE: 18
PIF_VALUE: 21
PIF_VALUE: 16
PIF_VALUE: 7
PIF_VALUE: 22
PIF_VALUE: 16
PIF_VALUE: 18
PIF_VALUE: 17
PIF_VALUE: 23
PIF_VALUE: 17
PIF_VALUE: 19
PIF_VALUE: 18
PIF_VALUE: 16
PIF_VALUE: 18
PIF_VALUE: 19
PIF_VALUE: 18
PIF_VALUE: 17
PIF_VALUE: 1
PIF_VALUE: 25
PIF_VALUE: 18
PIF_VALUE: 19
PIF_VALUE: 17
PIF_VALUE: 17
PIF_VALUE: 18
PIF_VALUE: 0
PIF_VALUE: 23
PIF_VALUE: 18
PIF_VALUE: 25
PIF_VALUE: 18
PIF_VALUE: 1
PIF_VALUE: 17
PIF_VALUE: 18
PIF_VALUE: 18
PIF_VALUE: 1
PIF_VALUE: 21
PIF_VALUE: 0
PIF_VALUE: 16
PIF_VALUE: 1
PIF_VALUE: 19
PIF_VALUE: 17
PIF_VALUE: 18
PIF_VALUE: 20
PIF_VALUE: 0
PIF_VALUE: 0
PIF_VALUE: 17
PIF_VALUE: 19
PIF_VALUE: 18
PIF_VALUE: 20
PIF_VALUE: 17
PIF_VALUE: 16
PIF_VALUE: 22
PIF_VALUE: 19
PIF_VALUE: 1
PIF_VALUE: 20
PIF_VALUE: 16
PIF_VALUE: 20
PIF_VALUE: 19
PIF_VALUE: 21
PIF_VALUE: 18
PIF_VALUE: 18
PIF_VALUE: 17
PIF_VALUE: 20
PIF_VALUE: 20
PIF_VALUE: 21
PIF_VALUE: 17
PIF_VALUE: 23
PIF_VALUE: 9
PIF_VALUE: 21
PIF_VALUE: 17
PIF_VALUE: 20
PIF_VALUE: 17
PIF_VALUE: 19
PIF_VALUE: 20
PIF_VALUE: 19
PIF_VALUE: 18
PIF_VALUE: 17
PIF_VALUE: 16
PIF_VALUE: 0
PIF_VALUE: 9
PIF_VALUE: 1
PIF_VALUE: 17
PIF_VALUE: 17
PIF_VALUE: 18
PIF_VALUE: 18
PIF_VALUE: 21
PIF_VALUE: 1
PIF_VALUE: 0
PIF_VALUE: 3
PIF_VALUE: 0
PIF_VALUE: 0
PIF_VALUE: 19
PIF_VALUE: 21
PIF_VALUE: 17
PIF_VALUE: 0
PIF_VALUE: 19
PIF_VALUE: 18
PIF_VALUE: 19
PIF_VALUE: 17
PIF_VALUE: 17
PIF_VALUE: 21
PIF_VALUE: 17
PIF_VALUE: 17
PIF_VALUE: 16
PIF_VALUE: 21
PIF_VALUE: 19
PIF_VALUE: 19
PIF_VALUE: 1
PIF_VALUE: 22

## 2019-01-01 ASSESSMENT — PAIN DESCRIPTION - PAIN TYPE
TYPE: ACUTE PAIN
TYPE: CHRONIC PAIN
TYPE: CHRONIC PAIN
TYPE: ACUTE PAIN
TYPE: CHRONIC PAIN
TYPE: ACUTE PAIN
TYPE: CHRONIC PAIN
TYPE: ACUTE PAIN

## 2019-01-01 ASSESSMENT — PAIN DESCRIPTION - PROGRESSION
CLINICAL_PROGRESSION: RESOLVED
CLINICAL_PROGRESSION: NOT CHANGED
CLINICAL_PROGRESSION: RESOLVED
CLINICAL_PROGRESSION: NOT CHANGED
CLINICAL_PROGRESSION: RAPIDLY IMPROVING
CLINICAL_PROGRESSION: GRADUALLY IMPROVING
CLINICAL_PROGRESSION: GRADUALLY WORSENING
CLINICAL_PROGRESSION: NOT CHANGED
CLINICAL_PROGRESSION: NOT CHANGED
CLINICAL_PROGRESSION: GRADUALLY WORSENING
CLINICAL_PROGRESSION: RESOLVED
CLINICAL_PROGRESSION: NOT CHANGED

## 2019-01-01 ASSESSMENT — PAIN DESCRIPTION - ONSET
ONSET: ON-GOING

## 2019-01-01 ASSESSMENT — PAIN DESCRIPTION - ORIENTATION
ORIENTATION: RIGHT
ORIENTATION: RIGHT
ORIENTATION: MID
ORIENTATION: RIGHT
ORIENTATION: LEFT
ORIENTATION: RIGHT

## 2019-01-01 ASSESSMENT — PAIN SCALES - GENERAL
PAINLEVEL_OUTOF10: 8
PAINLEVEL_OUTOF10: 0
PAINLEVEL_OUTOF10: 5
PAINLEVEL_OUTOF10: 5
PAINLEVEL_OUTOF10: 8
PAINLEVEL_OUTOF10: 7
PAINLEVEL_OUTOF10: 10
PAINLEVEL_OUTOF10: 0
PAINLEVEL_OUTOF10: 0
PAINLEVEL_OUTOF10: 6
PAINLEVEL_OUTOF10: 8
PAINLEVEL_OUTOF10: 0
PAINLEVEL_OUTOF10: 3
PAINLEVEL_OUTOF10: 5
PAINLEVEL_OUTOF10: 9
PAINLEVEL_OUTOF10: 6
PAINLEVEL_OUTOF10: 7
PAINLEVEL_OUTOF10: 0
PAINLEVEL_OUTOF10: 3
PAINLEVEL_OUTOF10: 3
PAINLEVEL_OUTOF10: 9
PAINLEVEL_OUTOF10: 0
PAINLEVEL_OUTOF10: 8
PAINLEVEL_OUTOF10: 4
PAINLEVEL_OUTOF10: 0
PAINLEVEL_OUTOF10: 0
PAINLEVEL_OUTOF10: 7
PAINLEVEL_OUTOF10: 8
PAINLEVEL_OUTOF10: 0
PAINLEVEL_OUTOF10: 0
PAINLEVEL_OUTOF10: 6
PAINLEVEL_OUTOF10: 8
PAINLEVEL_OUTOF10: 0
PAINLEVEL_OUTOF10: 7
PAINLEVEL_OUTOF10: 2

## 2019-01-01 ASSESSMENT — ENCOUNTER SYMPTOMS
VOMITING: 1
ANAL BLEEDING: 0
VOMITING: 1
DIARRHEA: 1
NAUSEA: 1
BACK PAIN: 0
NAUSEA: 1
RECTAL PAIN: 0
BACK PAIN: 0
EYE ITCHING: 0
COLOR CHANGE: 0
SORE THROAT: 0
ABDOMINAL PAIN: 1
CHOKING: 0
PHOTOPHOBIA: 0
ABDOMINAL PAIN: 1
COUGH: 0
SHORTNESS OF BREATH: 0
APNEA: 0
EYE REDNESS: 0
SORE THROAT: 0
STRIDOR: 0
CONSTIPATION: 0
COLOR CHANGE: 0

## 2019-01-01 ASSESSMENT — PAIN DESCRIPTION - DESCRIPTORS
DESCRIPTORS: SHARP
DESCRIPTORS: ACHING
DESCRIPTORS: THROBBING
DESCRIPTORS: ACHING
DESCRIPTORS: ACHING;DISCOMFORT
DESCRIPTORS: ACHING
DESCRIPTORS: SHARP
DESCRIPTORS: SHARP
DESCRIPTORS: ACHING
DESCRIPTORS: SHARP

## 2019-01-01 ASSESSMENT — PAIN DESCRIPTION - FREQUENCY
FREQUENCY: INTERMITTENT
FREQUENCY: CONTINUOUS
FREQUENCY: CONTINUOUS
FREQUENCY: INTERMITTENT
FREQUENCY: CONTINUOUS

## 2019-01-01 ASSESSMENT — PAIN DESCRIPTION - LOCATION
LOCATION: SHOULDER
LOCATION: BACK;SHOULDER
LOCATION: SHOULDER
LOCATION: BACK;SHOULDER;NECK
LOCATION: SHOULDER
LOCATION: ABDOMEN
LOCATION: SHOULDER
LOCATION: ABDOMEN
LOCATION: SHOULDER
LOCATION: ABDOMEN
LOCATION: GENERALIZED
LOCATION: SHOULDER
LOCATION: SHOULDER

## 2019-01-01 ASSESSMENT — PAIN - FUNCTIONAL ASSESSMENT
PAIN_FUNCTIONAL_ASSESSMENT: PREVENTS OR INTERFERES SOME ACTIVE ACTIVITIES AND ADLS

## 2019-06-03 PROBLEM — R91.8 LUNG MASS: Status: ACTIVE | Noted: 2019-01-01

## 2019-06-03 NOTE — ED PROVIDER NOTES
Emergency Department Encounter    Patient: Barrington Fernández  MRN: [id number]  : 1931  Date of Evaluation: 6/3/2019  ED Provider:  Alfredo Subramanian    Triage Chief Complaint:   Fall and Fatigue    Native:  Barrington Fernández is a 80 y.o. female that presents With generalized weakness,  states she's been sleeping more than she typically does over the last 24-48 hours, falling asleep on the couch or chair. She is having to use her walker which she has not done in many years. Decreased appetite over the last few weeks worsened over the last 48 hours. This morning her legs gave out and she fell to the ground, does not believe she hurt herself has no pain currently. No fevers. No URI symptoms recently. No GI symptoms. ROS:  General:  No fevers, no chills, + weakness  Eyes:  No recent vison changes, no discharge  ENT:  No sore throat, no nasal congestion, no hearing changes  Cardiovascular:  No chest pain, no palpitations  Respiratory:  No shortness of breath, no cough, no wheezing  Gastrointestinal:  No pain, no nausea, no vomiting, no diarrhea  Musculoskeletal:  No muscle pain, no joint pain  Skin:  No rash, no pruritis, no easy bruising  Neurologic:  No speech problems, no headache  Genitourinary:  No dysuria, no hematuria  Endocrine:  No unexpected weight gain, no unexpected weight loss, + decreased appetite  Extremities:  no edema, no pain    Past Medical History:   Diagnosis Date    Arrhythmia     Arthritis     Bladder cancer ([address]     Blood circulation, collateral     CAD (coronary artery disease)     Chronic kidney disease, stage III (moderate) ([address] 2015    DM (diabetes mellitus) ([address]     Glaucoma     H/O Doppler ultrasound 2013;--PERIPHERAL arterial duplex patent aortobifem bypasses;--Normal study.     HTN (hypertension)     Hx of 24 hour EKG monitoring 2008    Max heart rate 94, minimum is 45 and average is 66,  Longesy RR is 2.8 seconds  Hx of cardiovascular stress test      EF 70%    Hx of echocardiogram     EF>55%    Hyperlipidemia     Murmur     Pneumonia     [facility]     Rhabdomyolysis 2015     Past Surgical History:   Procedure Laterality Date    AORTA SURGERY      BACK SURGERY      BLADDER SURGERY      ca    CHOLECYSTECTOMY      COLONOSCOPY      EYE SURGERY      HYSTERECTOMY      KIDNEY SURGERY      left    PACEMAKER INSERTION  2016    PACEMAKER PLACEMENT      [facility]    PANCREAS SURGERY      VASCULAR SURGERY       Family History   Problem Relation Age of Onset    Dementia Father     Heart Failure Father         CVA    Stroke Father     Diabetes Mother     Heart Failure Brother         CVA     Social History     Socioeconomic History    Marital status:      Spouse name: Not on file    Number of children: Not on file    Years of education: Not on file    Highest education level: Not on file   Occupational History    Not on file   Social Needs    Financial resource strain: Not on file    Food insecurity:     Worry: Not on file     Inability: Not on file    Transportation needs:     Medical: Not on file     Non-medical: Not on file   Tobacco Use    Smoking status: Former Smoker     Packs/day: 1.00     Years: 50.00     Pack years: 50.00     Start date: 3/2/1960     Last attempt to quit: 3/2/1998     Years since quittin.2    Smokeless tobacco: Never Used   Substance and Sexual Activity    Alcohol use: No     Comment: 2 cups coffee daily    Drug use: No    Sexual activity: Not Currently     Partners: Male   Lifestyle    Physical activity:     Days per week: Not on file     Minutes per session: Not on file    Stress: Not on file   Relationships    Social connections:     Talks on phone: Not on file     Gets together: Not on file     Attends Taoism service: Not on file     Active member of club or organization: Not on file     Attends meetings of clubs or organizations: Not on file     Relationship status: Not on file    Intimate partner violence:     Fear of current or ex partner: Not on file     Emotionally abused: Not on file     Physically abused: Not on file     Forced sexual activity: Not on file   Other Topics Concern    Not on file   Social History Narrative    Not on file     No current facility-administered medications for this encounter. Current Outpatient Medications   Medication Sig Dispense Refill    Dulaglutide (TRULICITY) 9.91 GB/9.0DA SOPN Inject into the skin once a week      amLODIPine (NORVASC) 10 MG tablet Take 1 tablet by mouth daily 30 tablet 5    furosemide (LASIX) 40 MG tablet Take 40 mg by mouth daily      ramipril (ALTACE) 5 MG capsule Take 5 mg by mouth daily      latanoprost (XALATAN) 0.005 % ophthalmic solution Place 1 drop into both eyes nightly      Insulin Aspart (NOVOLOG FLEXPEN SC) Inject into the skin 3 times daily Patient is on a sliding scale       polyethylene glycol (GLYCOLAX) packet Take 17 g by mouth daily 527 g 1    Insulin Detemir (LEVEMIR FLEXPEN) 100 UNIT/ML SOPN Inject into the skin 2 times daily 40 units in the morning and 30 units in the evening      aspirin 81 MG EC tablet Take 81 mg by mouth daily.  omeprazole (PRILOSEC) 20 MG capsule Take 20 mg by mouth daily.  metoprolol succinate (TOPROL XL) 50 MG extended release tablet Take 50 mg by mouth daily       mirtazapine (REMERON) 15 MG tablet Take 7.5 mg by mouth nightly       gabapentin (NEURONTIN) 300 MG capsule Take 300 mg by mouth 3 times daily.  potassium chloride (MICRO-K) 10 MEQ CR capsule Take 10 mEq by mouth 2 times daily      multivitamin (THERAGRAN) per tablet Take 1 tablet by mouth daily.          Allergies   Allergen Reactions    Vytorin [Ezetimibe-Simvastatin] Other (See Comments)     Rhabdomyolysis    Aricept [Donepezil Hcl]      Muscle cramps    Boniva 12.5 - 16.0 GM/DL    Hematocrit 35.3 (L) 37 - 47 %    MCV 91.2 78 - 100 FL    MCH 28.4 27 - 31 PG    MCHC 31.2 (L) 32.0 - 36.0 %    RDW 12.8 11.7 - 14.9 %    Platelets 087 288 - 400 K/CU MM    MPV 11.0 7.5 - 11.1 FL    Bands Relative 11 5 - 11 %    Segs Relative 74.0 (H) 36 - 66 %    Lymphocytes % 8.0 (L) 24 - 44 %    Monocytes % 7.0 (H) 0 - 4 %    Bands Absolute 2.41 K/CU MM    Segs Absolute 16.2 K/CU MM    Lymphocytes # 1.8 K/CU MM    Monocytes # 1.5 K/CU MM    Differential Type MANUAL DIFFERENTIAL     Polychromasia 1+     Toxic Granulation PRESENT     WBC Morphology FEW  ATYPICAL LYMPH(S) NOTED       PLT Morphology SEVERAL LARGE PLATELETS    Troponin   Result Value Ref Range    Troponin T <0.010 <0.01 NG/ML   EKG 12 Lead   Result Value Ref Range    Ventricular Rate 65 BPM    Atrial Rate 65 BPM    P-R Interval 226 ms    QRS Duration 84 ms    Q-T Interval 436 ms    QTc Calculation (Bazett) 453 ms    P Axis 19 degrees    R Axis -26 degrees    T Axis 32 degrees    Diagnosis       Sinus rhythm with 1st degree AV block  Septal infarct , age undetermined  Abnormal ECG  When compared with ECG of 06-FEB-2018 17:28,  Septal infarct is now present        Radiographs (if obtained):    [] Radiologist's Report Reviewed:  CT Chest WO Contrast   Preliminary Result   There is a 3.2 cm right upper lobe mass, which is new from 2016, and is most   compatible with a primary lung neoplasm until proven otherwise. Further   evaluation with PET-CT and/or percutaneous needle biopsy should be considered. CT Cervical Spine WO Contrast   Final Result   No acute intracranial abnormality. Chronic microvascular ischemic changes. No cervical spine fracture or dislocation. Moderate multilevel degenerative   changes. Bilateral carotid bifurcation calcifications. CT Head WO Contrast   Final Result   No acute intracranial abnormality. Chronic microvascular ischemic changes.       No cervical spine fracture or

## 2019-06-03 NOTE — ED TRIAGE NOTES
Pt to ED with complaints of generalized weakness and fall this morning. Pt states her \"legs just went out\". Denies any pain from fall.  Alert and oriented upon arrival.

## 2019-06-03 NOTE — PROGRESS NOTES
Medication History  [facility]    Patient Name: Justin Blankenship 5/5/1931     Medication history has been completed by: Araceli Rojas CPhT    Source(s) of information: Patient's family and Insurance claims    Primary Care Physician: Piper Kim MD     [facility]    Allergies as of 06/03/2019 - Review Complete 06/03/2019   Allergen Reaction Noted    Vytorin [ezetimibe-simvastatin] Other (See Comments) 05/06/2015    Aricept [employer] hcl]  06/03/2019    Boniva [ibandronic acid]  06/03/2019    Nsaids  06/03/2019        Prior to Admission medications    Medication Sig Start Date End Date Taking? Authorizing Provider   Dulaglutide (TRULICITY) 3.01 MI/8.7FS SOPN Inject into the skin once a week   Yes Historical Provider, MD   amLODIPine (NORVASC) 10 MG tablet Take 1 tablet by mouth daily 7/8/16  Yes Kathie Valle MD   furosemide (LASIX) 40 MG tablet Take 40 mg by mouth daily   Yes Historical Provider, MD   ramipril (ALTACE) 5 MG capsule Take 5 mg by mouth daily   Yes Historical Provider, MD   latanoprost (XALATAN) 0.005 % ophthalmic solution Place 1 drop into both eyes nightly   Yes Historical Provider, MD   Insulin Aspart (NOVOLOG FLEXPEN SC) Inject into the skin 3 times daily Patient is on a sliding scale    Yes Historical Provider, MD   polyethylene glycol (GLYCOLAX) packet Take 17 g by mouth daily 5/22/15  Yes SHRUTI Henry Res, MD   Insulin Detemir (LEVEMIR FLEXPEN) 100 UNIT/ML SOPN Inject into the skin 2 times daily 40 units in the morning and 30 units in the evening   Yes Historical Provider, MD   aspirin 81 MG EC tablet Take 81 mg by mouth daily. Yes Historical Provider, MD   omeprazole (PRILOSEC) 20 MG capsule Take 20 mg by mouth daily.    Yes Historical Provider, MD   metoprolol succinate (TOPROL XL) 50 MG extended release tablet Take 50 mg by mouth daily    Yes Historical Provider, MD   mirtazapine (REMERON) 15 MG tablet Take 7.5 mg by mouth nightly    Yes Historical Provider, MD   gabapentin (NEURONTIN) 300 MG capsule Take 300 mg by mouth 3 times daily. Yes Historical Provider, MD       Medications added or changed (ex.  new medication, dosage change, interval change, formulation change):  Levemir dose changed to 40 units qam and 30 units hs per patient list   Novolog frequency changed to tid from hs per patient list  Trulicity new medication     Medications removed from list (include reason, ex. noncompliance, medication cost, therapy complete etc.):   Diazepam no longer taking  Multivitamin not on current med list  Potassium no recent claims not on current med list   Tizanidine no longer taking    Other Comments:  Reviewed and updated med list and allergies per patient list provided by [facility] patient took all her AM medications prior to arrival    To my knowledge the above medication history is accurate as of 6/3/2019 11:51 AM.   Telly Urban CPhT   6/3/2019 11:51 AM

## 2019-06-03 NOTE — ED NOTES
Bed: ED-29  Expected date:   Expected time:   Means of arrival:   Comments:  kecia Gomez RN  06/03/19 [id number]

## 2019-06-04 NOTE — PROGRESS NOTES
Pulmonary and Critical Care  Progress Note    Subjective: The patient had TTNA today. WBC down. Procalcitonin elevated. Shortness of breath none  Chest pain none  Addressing respiratory complaints Patient is negative for  hemoptysis and cyanosis  CONSTITUTIONAL:  negative for fevers and chills      Past Medical History:     has a past medical history of Arrhythmia, Arthritis, Bladder cancer ([facility] Blood circulation, collateral, CAD (coronary artery disease), Chronic kidney disease, stage III (moderate) ([facility] DM (diabetes mellitus) ([facility] Glaucoma, H/O Doppler ultrasound, HTN (hypertension), Hx of 24 hour EKG monitoring, Hx of cardiovascular stress test, Hx of echocardiogram, Hyperlipidemia, Murmur, Pneumonia, PVD (peripheral vascular disease) ([facility] and Rhabdomyolysis. has a past surgical history that includes Bladder surgery (1990); Cholecystectomy; Hysterectomy; back surgery; Aorta surgery (07/07); Kidney surgery; Colonoscopy; Pancreas surgery; eye surgery; pacemaker placement; vascular surgery; and Pacemaker insertion (03/2016). reports that she quit smoking about 21 years ago. She started smoking about 59 years ago. She has a 50.00 pack-year smoking history. She has never used smokeless tobacco. She reports that she does not drink alcohol or use drugs. Family history:  family history includes Dementia in her father; Diabetes in her mother; Heart Failure in her brother and father; Stroke in her father.     Allergies   Allergen Reactions    Vytorin [Ezetimibe-Simvastatin] Other (See Comments)     Rhabdomyolysis    Aricept [Donepezil Hcl]      Muscle cramps    Boniva [Ibandronic Acid]     Nsaids      Social History:    Reviewed; no changes    Objective:   PHYSICAL EXAM:        VITALS:  BP (!) 146/50   Pulse 72   Temp 98.4 °F (36.9 °C) (Oral)   Resp 15   Ht 5' 3\" (1.6 m)   Wt 140 lb (63.5 kg)   SpO2 92%   BMI 24.80 kg/m²     24HR INTAKE/OUTPUT:  No intake or output data in the 24 hours ending 06/04/19 1156    CONSTITUTIONAL:  awake, alert, cooperative, no apparent distress, and appears stated age  LUNGS:  decreased breath sounds  CARDIOVASCULAR:  normal S1 and S2 and negative JVD  ABD:Abdomen soft, non-tender. BS normal. No masses,  No organomegaly  NEURO:Alert and oriented x3. Gait normal. Reflexes and motor strength normal and symmetric. Cranial nerves 2-12 and sensation grossly intact. DATA:    CBC:  Recent Labs     06/03/19  0907 06/04/19  0615   WBC 21.9* 15.8*   RBC 3.87* 3.80*   HGB 11.0* 10.7*   HCT 35.3* 36.0*    282   MCV 91.2 94.7   MCH 28.4 28.2   MCHC 31.2* 29.7*   RDW 12.8 12.6   SEGSPCT 74.0* 78.0*   BANDSPCT 11  --       BMP:  Recent Labs     06/03/19  0907 06/04/19  0615   * 139   K 4.2 3.4*   CL 95* 103   CO2 23 22   BUN 27* 18   CREATININE 1.3* 1.0   CALCIUM 8.6 7.9*   GLUCOSE 165* 186*      ABG:  No results for input(s): PH, PO2ART, WJU2PEA, HCO3, BEART, O2SAT in the last 72 hours. Lab Results   Component Value Date    PROBNP 407.1 (H) 02/06/2018    PROBNP 350.8 (H) 05/14/2016     No results found for: [address] [facility]    Radiology Review:  Pertinent images / reports were reviewed as a part of this visit. Assessment:     Patient Active Problem List   Diagnosis    DM (diabetes mellitus)    Hyperlipidemia    PVD (peripheral vascular disease)    HTN (hypertension)    Chronic kidney disease, stage III (moderate)    Weakness    Fever    Sepsis ([address]    Hyponatremia    Bacteremia due to Klebsiella pneumoniae    Pneumobilia    Cardiac pacemaker    DM (diabetes mellitus), secondary uncontrolled ([address]    Leg weakness, bilateral    Gait disturbance    Acute cystitis without hematuria    Pain in pacemaker pocket    Lung mass       Plan:   1. Overall the patient has improved. 2. Check path report. 3. Discussed with the family.     [address] [place] [address] MD  6/4/2019  11:56 AM

## 2019-06-04 NOTE — CARE COORDINATION
0915 CM into see and pt not in room. [address]  1315 CM into see pt to initiate a safe discharge plan. Cm into see, introduced self and explained role of CM. Pt is alone in room. Pt is kind, alert and oriented. Pt lives with her  in a one floor plan with basement.  shared that  pt stays on one level. Pt has a walker but does not use. Pt has a PCP. Pt has insurance and is able to obtain her medications. Pt denies any needs at this time. Cm offered home care and pt and her  at this time decline. At this time pt plans to return home with her spouse. CM  Provided card and encouraged to call for any needs or concern. CM is available if any needs arise.    [address]

## 2019-06-04 NOTE — PROGRESS NOTES
Pt arrived to CT. Placed on CT table. Dr Brittanie Sanders spoke with  to obtain consent. Pt has dementia.

## 2019-06-04 NOTE — PROGRESS NOTES
1. 3* 1.0   GFRAA 47* >60   LABGLOM 39* 52*   GLUCOSE 165* 186*   PROT 6.8 6.2*   LABALBU 3.3* 3.2*   CALCIUM 8.6 7.9*   BILITOT 0.8 0.6   ALKPHOS 100 106   AST 25 17   ALT 17 13     PT/INR:    Recent Labs     06/04/19  0615   PROTIME 12.9*   INR 1.13     Meds:    sodium chloride flush  10 mL Intravenous 2 times per day    enoxaparin  30 mg Subcutaneous Daily    insulin lispro  0-6 Units Subcutaneous TID WC    insulin lispro  0-3 Units Subcutaneous Nightly    amLODIPine  10 mg Oral Daily    furosemide  40 mg Oral Daily    gabapentin  300 mg Oral TID    latanoprost  1 drop Both Eyes Nightly    metoprolol succinate  50 mg Oral Daily    mirtazapine  7.5 mg Oral Nightly    pantoprazole  40 mg Oral QAM AC    polyethylene glycol  17 g Oral Daily    ramipril  5 mg Oral Daily    cefTRIAXone (ROCEPHIN) IV  1 g Intravenous Daily    ipratropium-albuterol  1 ampule Inhalation Q4H WA    insulin glargine  30 Units Subcutaneous BID     PRN Meds: potassium chloride **OR** potassium alternative oral replacement **OR** potassium chloride, acetaminophen, sodium chloride flush, magnesium hydroxide, ondansetron, glucose, dextrose, glucagon (rDNA), dextrose    Assessment/Plan:   1. Right upper lobe mass, rule out primary neoplasm. Needle biopsy today. 2.  Post obstructive pneumonia. On IV Rocephin and resp tx. 2.  Hyponatremia. Resolved. 3.  Rule out urinary tract infection. 4.  Hypertension. 5.  Diabetes mellitus. 6.  Hyperlipidemia. 7.  Peripheral arterial disease. 8.  Remote history of tobacco use.            Adriano Rasmussen MD  6/4/2019 7:41 PM

## 2019-06-04 NOTE — PLAN OF CARE
Problem: Falls - Risk of:  Goal: Will remain free from falls  Description  Will remain free from falls  Outcome: Ongoing  Goal: Absence of physical injury  Description  Absence of physical injury  Outcome: Ongoing     Problem: Discharge Planning:  Goal: Participates in care planning  Description  Participates in care planning  Outcome: Ongoing  Goal: Discharged to appropriate level of care  Description  Discharged to appropriate level of care  Outcome: Ongoing     Problem: Activity Intolerance:  Goal: Ability to tolerate increased activity will improve  Description  Ability to tolerate increased activity will improve  Outcome: Ongoing     Problem: Anxiety/Stress:  Goal: Level of anxiety will decrease  Description  Level of anxiety will decrease  Outcome: Ongoing     Problem:  Bowel Function - Altered:  Goal: Bowel elimination is within specified parameters  Description  Bowel elimination is within specified parameters  Outcome: Ongoing     Problem: Fluid Volume - Deficit:  Goal: Absence of fluid volume deficit signs and symptoms  Description  Absence of fluid volume deficit signs and symptoms  Outcome: Ongoing  Goal: Electrolytes within specified parameters  Description  Electrolytes within specified parameters  Outcome: Ongoing     Problem: Mental Status - Impaired:  Goal: Absence of continued neurological deterioration signs and symptoms  Description  Absence of continued neurological deterioration signs and symptoms  Outcome: Ongoing  Goal: Mental status will be restored to baseline  Description  Mental status will be restored to baseline  Outcome: Ongoing     Problem: Mobility - Impaired:  Goal: Mobility will improve to maximum level  Description  Mobility will improve to maximum level  Outcome: Ongoing     Problem: Nutrition Deficit:  Goal: Ability to achieve adequate nutritional intake will improve  Description  Ability to achieve adequate nutritional intake will improve  Outcome: Ongoing     Problem: Pain:  Goal: Pain level will decrease  Description  Pain level will decrease  Outcome: Ongoing  Goal: Ability to notify healthcare provider of pain before it becomes unmanageable or unbearable will improve  Description  Ability to notify healthcare provider of pain before it becomes unmanageable or unbearable will improve  Outcome: Ongoing  Goal: Control of acute pain  Description  Control of acute pain  Outcome: Ongoing  Goal: Control of chronic pain  Description  Control of chronic pain  Outcome: Ongoing     Problem: Serum Glucose Level - Abnormal:  Goal: Ability to maintain appropriate glucose levels will improve to within specified parameters  Description  Ability to maintain appropriate glucose levels will improve to within specified parameters  Outcome: Ongoing     Problem: Skin Integrity - Impaired:  Goal: Will show no infection signs and symptoms  Description  Will show no infection signs and symptoms  Outcome: Ongoing  Goal: Absence of new skin breakdown  Description  Absence of new skin breakdown  Outcome: Ongoing     Problem: Sleep Pattern Disturbance:  Goal: Appears well-rested  Description  Appears well-rested  Outcome: Ongoing

## 2019-06-04 NOTE — CONSULTS
[address], [address] [facility]                                  CONSULTATION    PATIENT NAME: Anibal Stephenson                    :        1931  MED REC NO:   [id number]                          ROOM:       0669  ACCOUNT NO:   [de-identified]                           ADMIT DATE: 2019  PROVIDER:     Gita Santillan MD    CONSULT DATE:  2019    HISTORY OF PRESENT ILLNESS:  The patient is an 49-year-old lady who was  admitted through the emergency room because of generalized weakness and  weight loss. The patient has been having decreased appetite. She has  occasional cough. No history of hemoptysis, hematemesis, nausea, or  vomiting. No history of chest pain. In the emergency room, she had a  CAT scan of the chest, which showed 3.2 cm right upper lobe mass lesion,  which was new compare to 2016. She was subsequently admitted to the  hospital for further management. PAST MEDICAL HISTORY: DM, HLD,Arthritis. PAST SURGICAL HISTORY:  Reveal that she had pacemaker placement,  hysterectomy, colonoscopy, cholecystectomy, bladder surgery, and back  surgery. FAMILY HISTORY:  Reveals that her mother had diabetes. Father had  dementia, heart failure, and CVA. SOCIAL HISTORY:  Reveals that she quit smoking in , but used to  smoke a pack per day for 50 years prior to that. No history of alcohol  or drug abuse. MEDICATIONS:  Reviewed. ALLERGIES:  She is allergic to VYTORIN, ARICEPT, BONIVA, and NSAIDs. REVIEW OF SYSTEMS:  As mentioned in the history of present illness. PHYSICAL EXAMINATION:  GENERAL:  The patient is awake and responsive and pleasantly confused,  in no acute respiratory distress. VITAL SIGNS:  Her blood pressure is 194/76 mmHg, pulse of 94 per minute,  and respiratory rate of 18 per minute. Her temperature is 100.6 degrees  Fahrenheit. HEENT:  Essentially unremarkable.   NECK:

## 2019-06-04 NOTE — H&P
[address] [place] [address], [address] [place] [address]                              HISTORY AND PHYSICAL    PATIENT NAME: Rosaura South                    :        1931  MED REC NO:   [id number]                          ROOM:       4124  ACCOUNT NO:   [de-identified]                           ADMIT DATE: 2019  PROVIDER:     Sheryle Costain, MD    CHIEF COMPLAINT:  Generalized weakness, fatigue, and fall this morning. HISTORY OF PRESENT ILLNESS:  The patient is an 80-year-old   female who presented to the emergency room earlier today with complaints  of generalized weakness and fatigue. This morning, she felt very weak  in her legs and she slipped and fell. She did not injure herself. She  was brought to the emergency room and the patient was seen and evaluated  by Dr. Thai Hernadez. The patient had an extensive testing done. Chest  x-ray revealed that she had a right upper lobe mass. She underwent CT  scan of the chest, which revealed 3.2 cm right upper lobe mass, which is  new as compared to the previous CT scan of 2016. The patient's white  cell count is also noted to be high. The patient is admitted for  further evaluation and treatment. Pulmonary has been consulted. The  patient denies any fever, chills, chest pain, nausea, vomiting, weight  loss, hemoptysis, dysuria, frequency, or hematuria. ALLERGIES:  VYTORIN, ARICEPT, BONIVA, and NSAIDS. MEDICATIONS:  Prior to admission include aspirin 81 mg daily, MiraLAX  daily, Xalatan eye drops, metoprolol 50 mg daily, furosemide 40 mg,  Levemir insulin 40 units in the morning and 30 units in the evening,  NovoLog insulin at mealtime, Trulicity 7.17 mg once a week, Neurontin  300 mg three times a day, mirtazapine 15 mg half a tablet daily, Norvasc  10 mg, Prilosec 20 mg, ramipril 5 mg, and Zanaflex 4 mg as needed.     PAST MEDICAL HISTORY:  Diabetes mellitus, hypertension, coronary artery  disease, peripheral arterial disease, hyperlipidemia, depression,  gastroesophageal reflux disease, diabetic neuropathy, osteopenia,  chronic kidney disease, bladder cancer, dementia, and chronic venous  insufficiency. PAST SURGICAL HISTORY:  Includes total abdominal hysterectomy with  bilateral salpingo-oophorectomy, lumbar spine surgery, cholecystectomy,  appendectomy, hemorrhoid surgery, aortoiliac and renal bypass surgery by  Dr. Pawan Mercedes in , bilateral cataract surgery, pacemaker placement in  2016, and radiofrequency ablation of right and left greater saphenous  vein in 2017. FAMILY HISTORY:  Father  at the age of 80years old. Mother had  diabetes and coronary artery disease. SOCIAL HISTORY:  The patient is a former smoker. She quit smoking in  . She smoked one pack of cigarettes a day for a number of years. Denies any alcohol abuse or drug use. PHYSICAL EXAMINATION:  VITAL SIGNS:  Temperature 98.1 degrees Fahrenheit, blood pressure  117/42, pulse 69, respiratory rate 13, oxygen saturation 93% on room  air. HEENT:  Conjunctivae are normal.  Sclerae are nonicteric. Oropharynx is  moist.  NECK:  Supple. CHEST:  Clear to auscultation bilaterally. There are no wheezing,  rales, crackles, or rhonchi. HEART:  Rate and rhythm are regular. Normal S1 and S2.  ABDOMEN:  Soft and nontender. Positive bowel sounds. EXTREMITIES:  No edema. No calf tenderness. SKIN:  Chronic skin discoloration. NEUROLOGIC:  The patient is awake, alert, and oriented x3. Neuro exam  otherwise is nonfocal.    LABORATORY STUDIES AND X-RAY FINDINGS:  Metabolic panel revealed sodium  130, potassium 4.2, chloride 95, CO2 23, BUN 27, creatinine 1.3, glucose  165, calcium 8.6, albumin 3.3, and protein 6.8. Bilirubin, AST, ALT,  and alkaline phosphatase are normal.  Troponin T is less than 0.010.   On  the CBC, white cell count is 21.9, hemoglobin 11.0, hematocrit 35.3,  platelet count 887,054, segments 84%, and lymphocytes 8%. Electrocardiogram reveals sinus rhythm with first-degree AV block,  septal infarct. Chest x-ray, new 3.4 x 3.3 cm mass-like density along the suprahilar  right lung. Hip x-rays, no acute radiographic abnormality of the osseous pelvis. Mild osteoarthritis of the bilateral hips and SI joints. CT scan of the head, no acute intracranial abnormality. Chronic  microvascular ischemic changes. CT scan of the cervical spine, no cervical spine fracture or  dislocation. Moderate multilevel degenerative changes. CT scan of the chest; a 3.2-cm right upper lobe mass, which is new from  2016, most likely compatible with primary lung neoplasm until proven  otherwise. Urinalysis; leukocyte esterase moderate, wbc's 60, and bacteria few. IMPRESSION:  1. Right upper lobe mass, rule out primary neoplasm. 2.  Hyponatremia. 3.  Rule out urinary tract infection. 4.  Hypertension. 5.  Diabetes mellitus. 6.  Hyperlipidemia. 7.  Peripheral arterial disease. 8.  Remote history of tobacco use. PLAN:  The patient was seen and evaluated in the emergency room. The  patient is admitted for further evaluation and treatment. She has been  started on intravenous fluid and intravenous Rocephin. Pulmonary has  been consulted. The patient will require CT-guided lung biopsy. The  patient as well will also resume home medications. The patient will be  put on Accu-Cheks with sliding scale coverage as well. Further  recommendations to follow.         Phil Kent MD    D: 06/03/2019 19:33:44       T: 06/03/2019 22:16:09     [email or web address]  Job#: [id number]     Doc#: [id number]    CC:  Sánchez Waggoner MD

## 2019-06-05 NOTE — PROGRESS NOTES
Pulmonary and Critical Care  Progress Note    Subjective: The patient is improving. Shortness of breath none. Chest pain none. Addressing respiratory complaints Patient is negative for  hemoptysis and cyanosis. CONSTITUTIONAL:  negative for fevers and chills. Past Medical History:     has a past medical history of Arrhythmia, Arthritis, Bladder cancer ([facility] [address] Blood circulation, collateral, CAD (coronary artery disease), Chronic kidney disease, stage III (moderate) ([facility] [address] DM (diabetes mellitus) ([facility] [address] Glaucoma, H/O Doppler ultrasound, HTN (hypertension), Hx of 24 hour EKG monitoring, Hx of cardiovascular stress test, Hx of echocardiogram, Hyperlipidemia, Murmur, Pneumonia, PVD (peripheral vascular disease) ([facility] [address] and Rhabdomyolysis. has a past surgical history that includes Bladder surgery (1990); Cholecystectomy; Hysterectomy; back surgery; Aorta surgery (07/07); Kidney surgery; Colonoscopy; Pancreas surgery; eye surgery; pacemaker placement; vascular surgery; Pacemaker insertion (03/2016); and Lung biopsy (Right). reports that she quit smoking about 21 years ago. She started smoking about 59 years ago. She has a 50.00 pack-year smoking history. She has never used smokeless tobacco. She reports that she does not drink alcohol or use drugs. Family history:  family history includes Dementia in her father; Diabetes in her mother; Heart Failure in her brother and father; Stroke in her father.     Allergies   Allergen Reactions    Vytorin [Ezetimibe-Simvastatin] Other (See Comments)     Rhabdomyolysis    Aricept [Donepezil Hcl]      Muscle cramps    Boniva [Ibandronic Acid]     Nsaids      Social History:    Reviewed; no changes    Objective:   PHYSICAL EXAM:        VITALS:  BP (!) 166/74   Pulse 102   Temp 98 °F (36.7 °C) (Oral)   Resp 16   Ht 5' 3\" (1.6 m)   Wt 140 lb (63.5 kg)   SpO2 93%   BMI 24.80 kg/m²     24HR INTAKE/OUTPUT:      Intake/Output Summary (Last 24 hours) at 6/5/2019 1012  Last data filed at 6/4/2019 1605  Gross per 24 hour   Intake 1541 ml   Output --   Net 1541 ml       CONSTITUTIONAL:  awake, alert, cooperative, no apparent distress, and appears stated age  LUNGS:  decreased breath sounds. CARDIOVASCULAR:  normal S1 and S2 and negative JVD  ABD:Abdomen soft, non-tender. BS normal. No masses,  No organomegaly  NEURO:Alert and oriented x3. Gait normal. Reflexes and motor strength normal and symmetric. Cranial nerves 2-12 and sensation grossly intact. DATA:    CBC:  Recent Labs     06/03/19  0907 06/04/19 0615   WBC 21.9* 15.8*   RBC 3.87* 3.80*   HGB 11.0* 10.7*   HCT 35.3* 36.0*    282   MCV 91.2 94.7   MCH 28.4 28.2   MCHC 31.2* 29.7*   RDW 12.8 12.6   SEGSPCT 74.0* 78.0*   BANDSPCT 11  --       BMP:  Recent Labs     06/03/19  0907 06/04/19 0615   * 139   K 4.2 3.4*   CL 95* 103   CO2 23 22   BUN 27* 18   CREATININE 1.3* 1.0   CALCIUM 8.6 7.9*   GLUCOSE 165* 186*      ABG:  No results for input(s): PH, PO2ART, QFL6OYG, HCO3, BEART, O2SAT in the last 72 hours. Lab Results   Component Value Date    PROBNP 407.1 (H) 02/06/2018    PROBNP 350.8 (H) 05/14/2016     No results found for: [facility]    Radiology Review:  Pertinent images / reports were reviewed as a part of this visit. Assessment:     Patient Active Problem List   Diagnosis    DM (diabetes mellitus)    Hyperlipidemia    PVD (peripheral vascular disease)    HTN (hypertension)    Chronic kidney disease, stage III (moderate)    Weakness    Fever    Sepsis ([address]    Hyponatremia    Bacteremia due to Klebsiella pneumoniae    Pneumobilia    Cardiac pacemaker    DM (diabetes mellitus), secondary uncontrolled ([address]    Leg weakness, bilateral    Gait disturbance    Acute cystitis without hematuria    Pain in pacemaker pocket    Lung mass       Plan:   1. Overall the patient has improved. 2. Check path report. 3. Discussed with the family.   4. OT/PT eval.  Familia Gutierrez MD  6/5/2019  10:12 AM

## 2019-06-05 NOTE — PROGRESS NOTES
INTERNAL MEDICINE PROGRESS NOTE        Laney Cogan   5/5/1931   Primary Care Physician:  Andree Stafford MD  Admit Date: 6/3/2019     Subjective:   Pt is doing better today. Remains very weak. Denies chest pain, SOB, nausea, vomiting, abdominal pain. Remainder of ROS is unremarkable. Meds, labs and other notes reviewed. Appreciate eval and rec by Dr Bertram Baltazar. Had TTNA on 06/04/2019. CEA level normal.      CT Chest:  Impression:     There is a 3.2 cm right upper lobe mass, which is new from 2016, and is most  compatible with a primary lung neoplasm until proven otherwise.  Further  evaluation with PET-CT and/or percutaneous needle biopsy should be considered. Objective:   BP (!) 166/74   Pulse 102   Temp 98 °F (36.7 °C) (Oral)   Resp 16   Ht 5' 3\" (1.6 m)   Wt 140 lb (63.5 kg)   SpO2 93%   BMI 24.80 kg/m²    Recent Labs     06/04/19  1117 06/04/19  1748 06/04/19  2123 06/05/19  0201   POCGLU 177* 210* 217* 165*       I/O last 3 completed shifts: In: 1895 [P.O.:240; I.V.:1301]  Out: -   No intake/output data recorded.     Neck: no adenopathy and supple, symmetrical, trachea midline  Lungs: clear to auscultation bilaterally  Heart: regular rate and rhythm and S1, S2 normal  Abdomen: soft, non-tender; bowel sounds normal; no masses,  no organomegaly  Extremities: extremities normal, atraumatic, no cyanosis or edema  Neurologic: Grossly normal    Data Review  CBC with Differential:    Recent Labs     06/03/19  0907 06/04/19  0615   WBC 21.9* 15.8*   RBC 3.87* 3.80*   HGB 11.0* 10.7*   HCT 35.3* 36.0*    282   MCV 91.2 94.7   MCH 28.4 28.2   MCHC 31.2* 29.7*   RDW 12.8 12.6   SEGSPCT 74.0* 78.0*   BANDSPCT 11  --    LYMPHOPCT 8.0* 14.2*   MONOPCT 7.0* 6.2*   BASOPCT  --  0.4   MONOSABS 1.5 1.0   LYMPHSABS 1.8 2.3   EOSABS  --  0.1   BASOSABS  --  0.1   DIFFTYPE MANUAL DIFFERENTIAL AUTOMATED DIFFERENTIAL     CMP:    Recent Labs     06/03/19  0907 06/04/19  0615   * 139   K 4.2 3.4*   CL 95* 103   CO2 23 22   BUN 27* 18   CREATININE 1.3* 1.0   GFRAA 47* >60   LABGLOM 39* 52*   GLUCOSE 165* 186*   PROT 6.8 6.2*   LABALBU 3.3* 3.2*   CALCIUM 8.6 7.9*   BILITOT 0.8 0.6   ALKPHOS 100 106   AST 25 17   ALT 17 13     PT/INR:    Recent Labs     06/04/19  0615   PROTIME 12.9*   INR 1.13     Meds:    sodium chloride flush  10 mL Intravenous 2 times per day    enoxaparin  30 mg Subcutaneous Daily    insulin lispro  0-6 Units Subcutaneous TID WC    insulin lispro  0-3 Units Subcutaneous Nightly    amLODIPine  10 mg Oral Daily    furosemide  40 mg Oral Daily    gabapentin  300 mg Oral TID    latanoprost  1 drop Both Eyes Nightly    metoprolol succinate  50 mg Oral Daily    mirtazapine  7.5 mg Oral Nightly    pantoprazole  40 mg Oral QAM AC    polyethylene glycol  17 g Oral Daily    ramipril  5 mg Oral Daily    cefTRIAXone (ROCEPHIN) IV  1 g Intravenous Daily    ipratropium-albuterol  1 ampule Inhalation Q4H WA    insulin glargine  30 Units Subcutaneous BID     PRN Meds: cloNIDine, potassium chloride **OR** potassium alternative oral replacement **OR** potassium chloride, acetaminophen, sodium chloride flush, magnesium hydroxide, ondansetron, glucose, dextrose, glucagon (rDNA), dextrose    Assessment/Plan:   1. Right upper lobe mass, rule out primary neoplasm. Await path results. 2.  Post obstructive pneumonia. On IV Rocephin and resp tx. 3.  Hyponatremia. Resolved. 4.  Rule out urinary tract infection. 5.  Hypertension. 6.  Diabetes mellitus. 7.  Hyperlipidemia. 8.  Peripheral arterial disease. 9.  Remote history of tobacco use. 10.PT/OT eval. Discussed with  in detail.            Grant Wood MD  6/5/2019 8:28 AM DISPLAY PLAN FREE TEXT DISPLAY PLAN FREE TEXT DISPLAY PLAN FREE TEXT DISPLAY PLAN FREE TEXT DISPLAY PLAN FREE TEXT DISPLAY PLAN FREE TEXT

## 2019-06-05 NOTE — PROGRESS NOTES
Physical Therapy    Facility/Department: [facility]  Initial Assessment    NAME: Edis Lee  : 1931  MRN: [id number]    Date of Service: 2019    Discharge Recommendations:  IP Rehab        Assessment   Assessment: Pt is an 80 y.o. Female with medical history, surgical history, co-morbidities, and personal factors including Arrhythmia, Arthritis, Bladder cancer, Blood circulation, collateral, CAD (coronary artery disease), Chronic kidney disease, stage III (moderate), DM (diabetes mellitus), Glaucoma, H/O Doppler ultrasound, HTN (hypertension), Hx of 24 hour EKG monitoring, Hx of cardiovascular stress test, Hx of echocardiogram, Hyperlipidemia, Murmur, Pneumonia, PVD (peripheral vascular disease), Rhabdomyolysis, Bladder surgery (); Cholecystectomy; Hysterectomy; back surgery; Aorta surgery (); Kidney surgery; Colonoscopy; Pancreas surgery; eye surgery; pacemaker placement; vascular surgery; Pacemaker insertion (2016); and Lung biopsy (Right) with admission for fall and lung mass. Prior to admission, pt was independent with functional mobility and ADLs. Examination of body systems reveals decreased strength, decreased balance, decreased aerobic capacity, and decreased independence with functional mobility.         Prognosis: Good  Decision Making: High Complexity  Clinical Presentation: unpredictable characteristics  REQUIRES PT FOLLOW UP: Yes  Activity Tolerance  Activity Tolerance: Patient Tolerated treatment well         Restrictions  Restrictions/Precautions  Restrictions/Precautions: General Precautions, Fall Risk  Vision/Hearing  Vision: Within Functional Limits  Hearing: Within functional limits     Subjective  General  Chart Reviewed: Yes  Patient assessed for rehabilitation services?: Yes  Family / Caregiver Present: No  Follows Commands: Within Functional Limits  Pain Screening  Patient Currently in Pain: Yes  Pain Assessment  Pain Assessment: 0-10  Pain Level: 6  Pain Type: Acute Deviations: Decreased step length;Shuffles;Decreased step height;Slow Katie  Distance: 5 feet  Comments: therapist recommended front wheeled walker at this time  Ambulation 2  Surface - 2: level tile  Device 2: [address] 2: Minimal assistance; Moderate assistance  Gait Deviations: Slow Katie;Decreased step length;Shuffles;Decreased step height  Distance: 25 feet  Comments: with verbal and tactile cues for BLE placement, walker placement, and sequence throughout ambulation; with verbal and tactile cues to maintain upright posture in order to avoid COM shifting outside of TRICIA; significantly increased time for task completion     Balance  Posture: Poor(forward head, rounded shoulders, increased thoracic kyphosis)  Sitting - Static: Good  Sitting - Dynamic: Good  Standing - Static: Poor;+  Standing - Dynamic: Poor;+      Gait Training:  Cues were given for safety, sequence, device management, balance, posture, awareness, path. Therapeutic Activity Training:   Therapeutic activity training was instructed today. Cues were given for safety, sequence, UE/LE placement, awareness, and balance. Activities performed today included bed mobility training, sup-sit, sit-stand. Plan   Plan  Times per week: 3+  Current Treatment Recommendations: Strengthening, ROM, Balance Training, Functional Mobility Training, Transfer Training, Endurance Training, ADL/Self-care Training, Neuromuscular Re-education, Pain Management, Wheelchair Mobility Training, Patient/Caregiver Education & Training, Equipment Evaluation, Education, & procurement, IADL Training, Gait Training, Home Exercise Program, Safety Education & Training, Positioning, Stair training  Safety Devices  Type of devices:  All fall risk precautions in place, Patient at risk for falls, Call light within reach, Left in chair, Chair alarm in place, Gait belt, Nurse notified      AM-PAC Score  AM-PAC Inpatient Mobility Raw Score : 13 (06/05/19

## 2019-06-06 NOTE — PROGRESS NOTES
Occupational Therapy   Occupational Therapy Initial Assessment  Date: 2019   Patient Name: Britt Alicea  MRN: [id number]     : 1931    Date of Service: 2019    Discharge Recommendations:  IP Rehab, Patient would benefit from continued therapy after discharge       Assessment   Performance deficits / Impairments: Decreased functional mobility ; Decreased cognition;Decreased ADL status; Decreased high-level IADLs;Decreased endurance;Decreased strength;Decreased balance;Decreased safe awareness;Decreased ROM  Prognosis: Good  Decision Making: Medium Complexity  Assistance / Modification: Pt is an 80 y.o. F adm to hosp w/dx of R upper lung mass, r/o neoplasm, awaiting pathology report; post obstructive pneumonia; hyponatremia; r/o UTI; X ray L hip/thigh d/t pain - pending. Pt presents w/deficits in the above areas. Pt will benefit from acute OT services as well as cont'd OT services at d/c on ARU. OT services are necessary to restore pt's functional ADL independence, to restore pt's mobility independence, to address safety and A/E/DME needs, to address functional cognition, and to improve act tolerance and strength. Patient Education: role of therapy, sit<>stand, SPT, ambulation, toileting, LB dressing, hand hygiene at sink, feeding, edu in safe mobility tech, edu in d/c recommendations  Cues were given for safety, sequence, UE/LE placement, awareness, and balance. Barriers to Learning: memory imp noted  REQUIRES OT FOLLOW UP: Yes  Activity Tolerance  Activity Tolerance: Patient Tolerated treatment well;Patient limited by fatigue  Safety Devices  Safety Devices in place: Yes  Type of devices: Call light within reach;Nurse notified;Gait belt;Patient at risk for falls; Left in chair;Chair alarm in place           Patient Diagnosis(es): The encounter diagnosis was Lung mass.      has a past medical history of Arrhythmia, Arthritis, Bladder cancer ([facility] [address] Blood circulation, collateral, CAD (coronary artery disease), Chronic kidney disease, stage III (moderate) ([facility] [address] DM (diabetes mellitus) ([facility] [address] Glaucoma, H/O Doppler ultrasound, HTN (hypertension), Hx of 24 hour EKG monitoring, Hx of cardiovascular stress test, Hx of echocardiogram, Hyperlipidemia, Murmur, Pneumonia, PVD (peripheral vascular disease) ([facility] [address] and Rhabdomyolysis. has a past surgical history that includes Bladder surgery (1990); Cholecystectomy; Hysterectomy; back surgery; Aorta surgery (07/07); Kidney surgery; Colonoscopy; Pancreas surgery; eye surgery; pacemaker placement; vascular surgery; Pacemaker insertion (03/2016); and Lung biopsy (Right). Restrictions  Restrictions/Precautions  Restrictions/Precautions: General Precautions, Fall Risk  Implants present? : Pacemaker  Position Activity Restriction  Other position/activity restrictions: Cleared by RNRhianna, for OT eval. PIV RUE, saline locked. Portable tele. Subjective   General  Chart Reviewed: Yes  Patient assessed for rehabilitation services?: Yes  Family / Caregiver Present: No  Subjective  Subjective: Pt seated in chair, agreable to services. Lunch brought at tx onset.    Pain Assessment  Pain Assessment: 0-10  Pain Level: 8  Patient's Stated Pain Goal: No pain  Pain Type: Acute pain  Pain Location: Shoulder  Pain Orientation: Right  Pain Descriptors: Sharp  Pain Frequency: Intermittent  Pain Onset: On-going  Clinical Progression: Not changed  Functional Pain Assessment: Prevents or interferes some active activities and ADLs  Non-Pharmaceutical Pain Intervention(s): Rest  Social/Functional History  Social/Functional History  Lives With: Spouse(and sm dog (does not get under feet per pt))  Type of Home: House  Home Layout: One level, Laundry in basement(Pt does go down to basement, full flight to basement, L desc HR)  Home Access: Stairs to enter with rails  Entrance Stairs - Number of Steps: 3  Entrance Stairs - Rails: Right(asc)  Bathroom Shower/Tub: Walk-in shower(built in shower seat; stands to shower)  Bathroom Toilet: Handicap height(sink on L side)  Bathroom Equipment: Grab bars in shower  Home Equipment: Rolling walker, Cane, Long-handled shoehorn, Reacher, [address]  ADL Assistance: Independent  Homemaking Assistance: Needs assistance(goes out to eat; lately spouse has been doing cleaning; spouse does laundry )  Ambulation Assistance: Independent  Transfer Assistance: Independent  Active : No  Patient's  Info: spouse  Type of occupation: Homemaker, 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 Spouse is there all the time, no additional local support (children live out of state). Objective   Vision: Impaired  Vision Exceptions: Wears glasses at all times  Hearing: Within functional limits    Orientation  Overall Orientation Status: Impaired  Orientation Level: Oriented to place;Oriented to person;Disoriented to time(cueing for month and year)     Balance  Sitting Balance: Stand by assistance(to supervision dynamic supported sitting and unsupported sitting)  Standing Balance: Minimal assistance(light dynamic at sink (forward flexed posture, cueing to stand closer to sink counter, requiring increased A as time progressed d/t fatigue; standing duration x 2 min))     Tone RUE  RUE Tone: Normotonic  Tone LUE  LUE Tone: Normotonic     Bed mobility  Comment: n/t, as pt seated in chair upon therapy's arrival and left up in chair at end of session to eat lunch  Transfers  Stand Pivot Transfers: Minimal assistance(to toilet, chair  w/cues for hand placement)  Sit to stand: Moderate assistance(from chair, toilet w/cues for hand placement)  Stand to sit: Minimal assistance(to chair, mod A to chair; cues for positioning and hand placement)  Transfer Comments: amb in room w/RW x10ft x 2 at min A. Pt appears generally weak and fatigues easily.  See PT eval for clothing? [] 1   [] 2   [] 2   [] 3   [x] 3    [] 4      5. Taking care of personal grooming such as brushing teeth? [] 1   [] 2    [] 2 [] 3    [x] 3   [] 4      6. Eating meals? [] 1   [] 2   [] 2   [] 3   [] 3   [x] 4      Raw Score:  19    [24=0% impaired(CH), 23=1-19%(CI), 20-22=20-39%(CJ), 15-19=40-59%(CK), 10-14=60-79%(CL), 7-9=80-99%(CM), 6=100%(CN)]               Goals  Short term goals  Time Frame for Short term goals: until pt discharged from [facility] or goals met  Short term goal 1: Pt will complete bed mobility at sup. Short term goal 2: Pt will complete transfers and amb w/RW at [facility] for ADL completion. Short term goal 3: Pt will complete sp bath v. shower w/use of seat prn at s/u and SBA. Short term goal 4: Pt will complete toileting at s/u and mod indep. Short term goal 5: Pt will complete resistive UE ther ex at sup w/min vcues to increase act tolerance and strength.         Therapy Time   Individual Concurrent Group Co-treatment   Time In 1250         Time Out 1315         Minutes 25         Timed Code Treatment Minutes: 15 Minutes       Liz Keen, OT/L

## 2019-06-06 NOTE — PROGRESS NOTES
[address] filed at 6/5/2019 1421  Gross per 24 hour   Intake 200 ml   Output --   Net 200 ml       CONSTITUTIONAL:  awake, alert, cooperative, no apparent distress, and appears stated age  LUNGS:  decreased breath sounds. CARDIOVASCULAR:  normal S1 and S2 and negative JVD  ABD:Abdomen soft, non-tender. BS normal. No masses,  No organomegaly  NEURO:Alert and oriented x3. Gait normal. Reflexes and motor strength normal and symmetric. Cranial nerves 2-12 and sensation grossly intact. DATA:    CBC:  Recent Labs     06/04/19  0615 06/06/19  0629   WBC 15.8* 14.3*   RBC 3.80* 4.14*   HGB 10.7* 11.7*   HCT 36.0* 36.9*    337   MCV 94.7 89.1   MCH 28.2 28.3   MCHC 29.7* 31.7*   RDW 12.6 12.4   SEGSPCT 78.0* 73.9*      BMP:  Recent Labs     06/04/19  0615 06/06/19 0629    138   K 3.4* 4.0    100   CO2 22 25   BUN 18 12   CREATININE 1.0 1.0   CALCIUM 7.9* 8.9   GLUCOSE 186* 42*      ABG:  No results for input(s): PH, PO2ART, VXW4NWO, HCO3, BEART, O2SAT in the last 72 hours. Lab Results   Component Value Date    PROBNP 407.1 (H) 02/06/2018    PROBNP 350.8 (H) 05/14/2016     No results found for: [facility]    Radiology Review:  Pertinent images / reports were reviewed as a part of this visit. Assessment:     Patient Active Problem List   Diagnosis    DM (diabetes mellitus)    Hyperlipidemia    PVD (peripheral vascular disease)    HTN (hypertension)    Chronic kidney disease, stage III (moderate)    Weakness    Fever    Sepsis ([address]    Hyponatremia    Bacteremia due to Klebsiella pneumoniae    Pneumobilia    Cardiac pacemaker    DM (diabetes mellitus), secondary uncontrolled ([address]    Leg weakness, bilateral    Gait disturbance    Acute cystitis without hematuria    Pain in pacemaker pocket    Lung mass       Plan:   1. Overall the patient has improved. 2. Check path report.     Brooklyn Wynne MD  6/6/2019  11:14 AM

## 2019-06-06 NOTE — PROGRESS NOTES
Lab called, stated pt BG 46. Took BG and 44. Pt A/O and states she \"feels fine\" and her BG does this from time to time. Gave two tubes of glutose. Will recheck BG in 15 minutes per protocol. Notified Dr. Vinnie Villegas via PS.   Daniel Reagan, BSN, RN

## 2019-06-06 NOTE — PROGRESS NOTES
INTERNAL MEDICINE PROGRESS NOTE        Edis Osmarsuzanne   5/5/1931   Primary Care Physician:  Tate Driscoll MD  Admit Date: 6/3/2019     Subjective:   Pt is doing better today. Remains very weak. C/O left thigh pain. Denies chest pain, SOB, nausea, vomiting, abdominal pain. Remainder of ROS is unremarkable. Meds, labs and other notes reviewed. Appreciate eval and rec by Dr Maryjo Antonio. Had TTNA on 06/04/2019. CEA level normal.      CT Chest:  Impression:     There is a 3.2 cm right upper lobe mass, which is new from 2016, and is most  compatible with a primary lung neoplasm until proven otherwise.  Further  evaluation with PET-CT and/or percutaneous needle biopsy should be considered. Objective:   BP (!) 152/67   Pulse 103   Temp 98 °F (36.7 °C) (Oral)   Resp 18   Ht 5' 3\" (1.6 m)   Wt 140 lb (63.5 kg)   SpO2 90%   BMI 24.80 kg/m²    Recent Labs     06/05/19  0834 06/05/19  1209 06/05/19  1727 06/05/19  2155   POCGLU 90 137* 97 118*       I/O last 3 completed shifts: In: 400 [P.O.:400]  Out: -   No intake/output data recorded.     Neck: no adenopathy and supple, symmetrical, trachea midline  Lungs: clear to auscultation bilaterally  Heart: regular rate and rhythm and S1, S2 normal  Abdomen: soft, non-tender; bowel sounds normal; no masses,  no organomegaly  Extremities: extremities normal, atraumatic, no cyanosis or edema  Neurologic: Grossly normal    Data Review  CBC with Differential:    Recent Labs     06/03/19  0907 06/04/19  0615 06/06/19  0629   WBC 21.9* 15.8* 14.3*   RBC 3.87* 3.80* 4.14*   HGB 11.0* 10.7* 11.7*   HCT 35.3* 36.0* 36.9*    282 337   MCV 91.2 94.7 89.1   MCH 28.4 28.2 28.3   MCHC 31.2* 29.7* 31.7*   RDW 12.8 12.6 12.4   SEGSPCT 74.0* 78.0* 73.9*   BANDSPCT 11  --   --    LYMPHOPCT 8.0* 14.2* 16.4*   MONOPCT 7.0* 6.2* 7.6*   BASOPCT  --  0.4 0.5   MONOSABS 1.5 1.0 1.1   LYMPHSABS 1.8 2.3 2.3   EOSABS  --  0.1 0.2   BASOSABS  --  0.1 0.1   DIFFTYPE MANUAL DIFFERENTIAL AUTOMATED DIFFERENTIAL AUTOMATED DIFFERENTIAL     CMP:    Recent Labs     06/03/19  0907 06/04/19  0615   * 139   K 4.2 3.4*   CL 95* 103   CO2 23 22   BUN 27* 18   CREATININE 1.3* 1.0   GFRAA 47* >60   LABGLOM 39* 52*   GLUCOSE 165* 186*   PROT 6.8 6.2*   LABALBU 3.3* 3.2*   CALCIUM 8.6 7.9*   BILITOT 0.8 0.6   ALKPHOS 100 106   AST 25 17   ALT 17 13     PT/INR:    Recent Labs     06/04/19  0615   PROTIME 12.9*   INR 1.13     Meds:    sodium chloride flush  10 mL Intravenous 2 times per day    enoxaparin  30 mg Subcutaneous Daily    insulin lispro  0-6 Units Subcutaneous TID WC    insulin lispro  0-3 Units Subcutaneous Nightly    amLODIPine  10 mg Oral Daily    furosemide  40 mg Oral Daily    gabapentin  300 mg Oral TID    latanoprost  1 drop Both Eyes Nightly    metoprolol succinate  50 mg Oral Daily    mirtazapine  7.5 mg Oral Nightly    pantoprazole  40 mg Oral QAM AC    polyethylene glycol  17 g Oral Daily    ramipril  5 mg Oral Daily    cefTRIAXone (ROCEPHIN) IV  1 g Intravenous Daily    ipratropium-albuterol  1 ampule Inhalation Q4H WA    insulin glargine  30 Units Subcutaneous BID     PRN Meds: cloNIDine, HYDROcodone-acetaminophen, potassium chloride **OR** potassium alternative oral replacement **OR** potassium chloride, acetaminophen, sodium chloride flush, magnesium hydroxide, ondansetron, glucose, dextrose, glucagon (rDNA), dextrose    Assessment/Plan:   1. Right upper lobe mass, rule out primary neoplasm. Await path results. 2.  Post obstructive pneumonia. On IV Rocephin and resp tx. 3.  Hyponatremia. Resolved. 4.  Rule out urinary tract infection. 5.  Hypertension. 6.  Diabetes mellitus. 7.  Hyperlipidemia. 8.  Peripheral arterial disease. 9.  Remote history of tobacco use. 10.Order Xray left thigh/hip.            Houston Riggins MD  6/6/2019 7:48 AM

## 2019-06-07 NOTE — PROGRESS NOTES
Physical Therapy    Physical Therapy Treatment Note  Name: Sugey Quiroz MRN: [id number] :   1931   Date:  2019   Admission Date: 6/3/2019 Room:  [address] [place] [address]-   Restrictions/Precautions:  Restrictions/Precautions  Restrictions/Precautions: General Precautions, Fall Risk  Implants present? : Pacemaker       Communication with other providers:  Per nurse ok to tx  Subjective:  Patient states:  Agreeable to tx. Pt reports legs feel very weak  Pain:   Location, Type, Intensity (0/10 to 10/10):  No c/o pain  Objective:    Observation:  Alert and oriented  Treatment, including education/measures:  Sup to sit with min assist and cues needing increased time and effort using bed rail. Sit<=>stand with cga and cues for hand placement and safety from bed, chair and commode. Pt independent for romina care in sitting, cga for standing at sink to wash hands. amb cga with rw 10' x 2, 60' x 1 with one standing rest. Pt very weak and shaky when up amb. Ex in sitting: 3 reps trunk stretches with shoulder flex and cues for deep breathing  10 reps aps  10 reps laqs  Safety  Patient left safely in the chair, with call light/phone in reach with alarm applied. Gait belt was used for transfers and gait. Assessment / Impression:       Patient's tolerance of treatment: good  Adverse Reaction: na  Significant change in status and impact:  naq  Barriers to improvement:  Strength and safety  Plan for Next Session:    Cont.  POC  Time in:  1130  Time out:  1210  Timed treatment minutes:  40  Total treatment time:  36    Previously filed items:  Social/Functional History  Lives With: Spouse(and sm dog (does not get under feet per pt))  Type of Home: House  Home Layout: One level, Laundry in basement(Pt does go down to basement, full flight to basement, L desc HR)  Home Access: Stairs to enter with rails  Entrance Stairs - Number of Steps: 3  Entrance Stairs - Rails: Right(asc)  Bathroom Shower/Tub: Walk-in shower(built in shower seat; stands to shower)  Bathroom Toilet: Handicap height(sink on L side)  Bathroom Equipment: Grab bars in shower  Home Equipment: Rolling walker, Cane, Long-handled shoehorn, Reacher, [employer]  ADL Assistance: Independent  Homemaking Assistance: Needs assistance(goes out to eat; lately spouse has been doing cleaning; spouse does laundry )  Ambulation Assistance: Independent  Transfer Assistance: Independent  Active : No  Patient's  Info: spouse  Type of occupation: Homemaker, 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 Spouse is there all the time, no additional local support (children live out of state). Long term goals  Long term goal 1: In one week, pt will complete all bed mobility with SBAx1  Long term goal 2: In one week, pt will complete sit <> stand transfers with CGAx1  Long term goal 3: In one week, pt will ambulate 75 feet with CGAx1 with LRAD  Long term goal 4: In one week, pt will ascend/descend 6 steps with a handrail with modAx1   Long term goal 5:  In one week, pt will independently complete 3 sets of 10 reps of BLE AROM exercises in available and allowed ROM    Electronically signed by:    Sara Marx PTA  6/7/2019, 8:18 AM

## 2019-06-07 NOTE — CONSULTS
[facility]  Consult note    2019  12:42 PM    Patient: Nancy Peguero  : 1931   80 y.o. MRN: [id number]  Admitted: 6/3/2019  9:02 AM ATT: Yanira Amaya MD   [id number]  AdmitDx: Lung mass [R91.8]  PCP: Yanira Amaya MD    Reason for Consult:  Small cell lung cancer    Requesting Physician:  Yanira Amaya MD      History Obtained From:  Patient and review of all records    HISTORY OF PRESENT ILLNESS:    Very pleasant 80-year-old female otherwise healthy prior to this episode reported that she has been having generalized weakness for the past two 1 month along with weight loss. Reported about 15 pounds weight loss. Poor appetite. Reported nonproductive cough. Denied any chest pain. Denied any palpitations but reported intermittent dizziness. Denied any hemoptysis. Denied any fevers. Denied any abdominal pain, nausea, vomiting, diarrhea but reported intermittent constipation. Denied any lower extremity edema. Reported that she felt that her left leg gave way and had obtained a fall a day prior to the admission. Denied any headaches, focal weakness, loss of vision. 6/3/2019 CT scan of the head without contrast:  No acute intracranial abnormality.  Chronic microvascular ischemic changes.       No cervical spine fracture or dislocation.  Moderate multilevel degenerative   changes.       Bilateral carotid bifurcation calcifications. 6/3/2019 CT cervical spine without contrast  No acute intracranial abnormality.  Chronic microvascular ischemic changes.       No cervical spine fracture or dislocation.  Moderate multilevel degenerative   changes.       Bilateral carotid bifurcation calcifications. 6/3/2019 CT scan chest without contrast  There is a 3.2 cm right upper lobe mass, which is new from 2016, and is most   compatible with a primary lung neoplasm until proven otherwise.  Further   evaluation with PET-CT and/or percutaneous needle biopsy should be considered.      2019 lung biopsyFinal Pathologic Diagnosis:  Lung mass, right upper lobe; CT guided needle biopsy:  -     HIGH-GRADE NEUROENDOCRINE CARCINOMA (SMALL CELL  CARCINOMA). Microscopic Description:  IMMUNOHISTOCHEMICAL STAIN REPORT:  Immunostains x 11 (adequate control):   CK AE1/3, CK 5/6, CK  7, Ki-67, TTF-1, P 40, Napsin A, synaptophysin,  chromogranin, CD 56 and CD45 are performed on block 2. The atypical small blue cells are positive for CK AE1/3  (perinuclear dot-like pattern), CK 7 and CD 56.  They are  negative for CK 5/6, TTF-1, Napsin A, p40, CD45 and  chromogranin.  Synaptophysin stain is noncontributory. Ki-67 stain shows more than 90% positive cells. 6/4/2019 CMP with a sodium of 139 potassium of 3.4 BUN of 18 creatinine 1 calcium 7.9 albumin 3.2 rest of the LFTs within normal limits. CBC with WBC 15.8 hemoglobin of 10.7 hematocrit of 36.0 MCV of 94.7 and platelets of 608    8/3/5312 CBC with WBC of 14.3 hemoglobin 11.7 hematocrit 36.9 MCV of 89.1 and platelets of 680    Past Medical History:        Diagnosis Date    Arrhythmia     Arthritis     Bladder cancer ([address]     Blood circulation, collateral     CAD (coronary artery disease)     Chronic kidney disease, stage III (moderate) ([address] 5/5/2015    DM (diabetes mellitus) ([address]     Glaucoma     H/O Doppler ultrasound 11/25/2013;12/11 11/13--PERIPHERAL arterial duplex patent aortobifem bypasses;12/11--Normal study.     HTN (hypertension)     Hx of 24 hour EKG monitoring 01-    Max heart rate 94, minimum is 45 and average is 66,  Longesy RR is 2.8 seconds                                                            Hx of cardiovascular stress test 12/11     EF 70%    Hx of echocardiogram 03/08    EF>55%    Hyperlipidemia     Murmur     Pneumonia     PVD (peripheral vascular disease) ([address]     Rhabdomyolysis 5/5/2015       Past Surgical History:        Procedure Laterality Date    AORTA SURGERY  07/07    BACK SURGERY      BLADDER SURGERY 1990    [place]    CHOLECYSTECTOMY      COLONOSCOPY      EYE SURGERY      HYSTERECTOMY      KIDNEY SURGERY      left    LUNG BIOPSY Right     Dr Zachary Serrano  03/2016    PACEMAKER PLACEMENT      [facility]    PANCREAS SURGERY      VASCULAR SURGERY           Current Medications:    Medications    Scheduled Medications:    insulin glargine  20 Units Subcutaneous BID    sodium chloride flush  10 mL Intravenous 2 times per day    enoxaparin  30 mg Subcutaneous Daily    insulin lispro  0-6 Units Subcutaneous TID WC    insulin lispro  0-3 Units Subcutaneous Nightly    amLODIPine  10 mg Oral Daily    furosemide  40 mg Oral Daily    gabapentin  300 mg Oral TID    latanoprost  1 drop Both Eyes Nightly    metoprolol succinate  50 mg Oral Daily    mirtazapine  7.5 mg Oral Nightly    pantoprazole  40 mg Oral QAM AC    polyethylene glycol  17 g Oral Daily    ramipril  5 mg Oral Daily    cefTRIAXone (ROCEPHIN) IV  1 g Intravenous Daily     PRN Medications: ipratropium-albuterol, cloNIDine, HYDROcodone-acetaminophen, potassium chloride **OR** potassium alternative oral replacement **OR** potassium chloride, acetaminophen, sodium chloride flush, magnesium hydroxide, ondansetron, glucose, dextrose, glucagon (rDNA), dextrose      Allergies:  Vytorin [ezetimibe-simvastatin]; Aricept [donepezil hcl]; Boniva [ibandronic acid]; and Nsaids    Social History:   TOBACCO:   reports that she quit smoking about 21 years ago. She started smoking about 59 years ago. She has a 50.00 pack-year smoking history. She has never used smokeless tobacco.  ETOH:   reports that she does not drink alcohol.     Family History:       Problem Relation Age of Onset    Dementia Father     Heart Failure Father         CVA    Stroke Father     Diabetes Mother     Heart Failure Brother         CVA         REVIEW OF SYSTEMS:    Per HPI    PHYSICAL EXAM:      Vitals:    /62   Pulse 81   Temp 98.2 °F (36.8 °C) (Oral)

## 2019-06-07 NOTE — PROGRESS NOTES
INTERNAL MEDICINE PROGRESS NOTE        Lc Handler   5/5/1931   Primary Care Physician:  Sean Mendoza MD  Admit Date: 6/3/2019     Subjective:   Pt is doing better today. Denies chest pain, SOB, nausea, vomiting, abdominal pain. Remainder of ROS is unremarkable. Meds, labs and other notes reviewed. Appreciate eval and rec by Dr Oneil Arvizu. Had TTNA on 06/04/2019. Path + for small cell carcinoma. Left hip and femur x rays reviewed. Discussed with pt about the diagnosis. CT Chest:  Impression:     There is a 3.2 cm right upper lobe mass, which is new from 2016, and is most  compatible with a primary lung neoplasm until proven otherwise.  Further  evaluation with PET-CT and/or percutaneous needle biopsy should be considered. Objective:   BP (!) 159/72   Pulse 78   Temp 98 °F (36.7 °C) (Oral)   Resp 16   Ht 5' 3\" (1.6 m)   Wt 140 lb (63.5 kg)   SpO2 92%   BMI 24.80 kg/m²    Recent Labs     06/06/19  1238 06/06/19  1700 06/06/19  2259 06/07/19  0237   POCGLU 156* 298* 219* 101*       I/O last 3 completed shifts: In: 640 [P.O.:640]  Out: -   No intake/output data recorded.     Neck: no adenopathy and supple, symmetrical, trachea midline  Lungs: clear to auscultation bilaterally  Heart: regular rate and rhythm and S1, S2 normal  Abdomen: soft, non-tender; bowel sounds normal; no masses,  no organomegaly  Extremities: extremities normal, atraumatic, no cyanosis or edema  Neurologic: Grossly normal    Data Review  CBC with Differential:    Recent Labs     06/06/19  0629   WBC 14.3*   RBC 4.14*   HGB 11.7*   HCT 36.9*      MCV 89.1   MCH 28.3   MCHC 31.7*   RDW 12.4   SEGSPCT 73.9*   LYMPHOPCT 16.4*   MONOPCT 7.6*   BASOPCT 0.5   MONOSABS 1.1   LYMPHSABS 2.3   EOSABS 0.2   BASOSABS 0.1   DIFFTYPE AUTOMATED DIFFERENTIAL     CMP:    Recent Labs     06/06/19  0629      K 4.0      CO2 25   BUN 12   CREATININE 1.0   GFRAA >60   LABGLOM 52*   GLUCOSE 42*   CALCIUM 8.9     PT/INR: No results for input(s): PROTIME, INR in the last 72 hours. Meds:    insulin glargine  20 Units Subcutaneous BID    sodium chloride flush  10 mL Intravenous 2 times per day    enoxaparin  30 mg Subcutaneous Daily    insulin lispro  0-6 Units Subcutaneous TID WC    insulin lispro  0-3 Units Subcutaneous Nightly    amLODIPine  10 mg Oral Daily    furosemide  40 mg Oral Daily    gabapentin  300 mg Oral TID    latanoprost  1 drop Both Eyes Nightly    metoprolol succinate  50 mg Oral Daily    mirtazapine  7.5 mg Oral Nightly    pantoprazole  40 mg Oral QAM AC    polyethylene glycol  17 g Oral Daily    ramipril  5 mg Oral Daily    cefTRIAXone (ROCEPHIN) IV  1 g Intravenous Daily    ipratropium-albuterol  1 ampule Inhalation Q4H WA     PRN Meds: cloNIDine, HYDROcodone-acetaminophen, potassium chloride **OR** potassium alternative oral replacement **OR** potassium chloride, acetaminophen, sodium chloride flush, magnesium hydroxide, ondansetron, glucose, dextrose, glucagon (rDNA), dextrose    Assessment/Plan:   1. Right upper lobe mass, small cell carcinoma. Consult hem/onc  2. Post obstructive pneumonia. Improving. On IV Rocephin and resp tx. 3.  Hyponatremia. Resolved. 4.  Generalized weakness. 5.  Hypertension. 6.  Diabetes mellitus. 7.  Hyperlipidemia. 8.  Peripheral arterial disease. 9.  Remote history of tobacco use.              Ivania Mg MD  6/7/2019 7:25 AM

## 2019-06-07 NOTE — PROGRESS NOTES
Pt needs mri of brain but has pacemaker and needs [employer] rep and cardiology nurse before mri can be performed.  Scheduled for magdiel crook 730 6/10/19 -we will send for pt magdiel crook

## 2019-06-08 NOTE — PROGRESS NOTES
INTERNAL MEDICINE PROGRESS NOTE        Brenda Sahni   5/5/1931   Primary Care Physician:  Krista Scott MD  Admit Date: 6/3/2019     Subjective:   Lung symptoms are improved. Patient continues to have weakness as well as  Difficulty with walking. She does not feel depressed. She does have decreased appetite    CT Chest:  Impression:     There is a 3.2 cm right upper lobe mass, which is new from 2016, and is most  compatible with a primary lung neoplasm until proven otherwise.  Further  evaluation with PET-CT and/or percutaneous needle biopsy should be considered. Objective:   /63   Pulse 83   Temp 98.4 °F (36.9 °C) (Oral)   Resp 16   Ht 5' 3\" (1.6 m)   Wt 140 lb (63.5 kg)   SpO2 93%   BMI 24.80 kg/m²    Recent Labs     06/07/19 2050 06/08/19  0218 06/08/19  0900 06/08/19  1250   POCGLU 169* 89 86 123*       I/O last 3 completed shifts:   In: 360 [P.O.:360]  Out: -   I/O this shift:  In: 10 [I.V.:10]  Out: -     Neck: no adenopathy and supple, symmetrical, trachea midline  Lungs: clear to auscultation bilaterally  Heart: regular rate and rhythm and S1, S2 normal  Abdomen: soft, non-tender; bowel sounds normal; no masses,  no organomegaly  Extremities: extremities normal, atraumatic, no cyanosis or edema  Neurologic: Grossly normal    Data Review  CBC with Differential:    Recent Labs     06/06/19  0629   WBC 14.3*   RBC 4.14*   HGB 11.7*   HCT 36.9*      MCV 89.1   MCH 28.3   MCHC 31.7*   RDW 12.4   SEGSPCT 73.9*   LYMPHOPCT 16.4*   MONOPCT 7.6*   BASOPCT 0.5   MONOSABS 1.1   LYMPHSABS 2.3   EOSABS 0.2   BASOSABS 0.1   DIFFTYPE AUTOMATED DIFFERENTIAL     CMP:    Recent Labs     06/06/19  0629 06/07/19  1413    133*   K 4.0 4.3    93*   CO2 25 26   BUN 12 13   CREATININE 1.0 1.2*   GFRAA >60 51*   LABGLOM 52* 42*   GLUCOSE 42* 208*   PROT  --  7.4   LABALBU  --  3.8   CALCIUM 8.9 9.2   BILITOT  --  0.4   ALKPHOS  --  127   AST  --  20   ALT  --  15     PT/INR:    No results for input(s): PROTIME, INR in the last 72 hours. Meds:    insulin glargine  20 Units Subcutaneous BID    sodium chloride flush  10 mL Intravenous 2 times per day    enoxaparin  30 mg Subcutaneous Daily    insulin lispro  0-6 Units Subcutaneous TID WC    insulin lispro  0-3 Units Subcutaneous Nightly    amLODIPine  10 mg Oral Daily    furosemide  40 mg Oral Daily    gabapentin  300 mg Oral TID    latanoprost  1 drop Both Eyes Nightly    metoprolol succinate  50 mg Oral Daily    mirtazapine  7.5 mg Oral Nightly    pantoprazole  40 mg Oral QAM AC    polyethylene glycol  17 g Oral Daily    ramipril  5 mg Oral Daily    cefTRIAXone (ROCEPHIN) IV  1 g Intravenous Daily     PRN Meds: ipratropium-albuterol, sodium chloride flush, cloNIDine, HYDROcodone-acetaminophen, potassium chloride **OR** potassium alternative oral replacement **OR** potassium chloride, acetaminophen, sodium chloride flush, magnesium hydroxide, ondansetron, glucose, dextrose, glucagon (rDNA), dextrose    Assessment/Plan:   1. Right upper lobe mass, small cell carcinoma. Discussed with oncologist as well as patient's family members ( Daughters). Mostly will require outpatient pet ct and may need chemo and radiation    2. Post obstructive pneumonia. Improving. On IV Rocephin and resp tx.   4.  Generalized weakness. Physical therapy note reviewed, may need placement. 5.  Hypertension.: in control  6. Diabetes mellitus.: mildly elevated, monitor  7. Hyperlipidemia.: stable, continue medications  8. Peripheral arterial disease. 9.  Remote history of tobacco use. As mentioned above, discussed with family, reviewed extensive records, including biopsy ct scan, has small spot in kidney, which will require follow up. ( PET CT will help also determine that lesion). Total time in care is more than 45 minutes.        Jacob Salinas MD  6/8/2019 2:48 PM

## 2019-06-08 NOTE — PROGRESS NOTES
Pulmonary and Critical Care  Progress Note    Subjective: The patient is better. Shortness of breath none. Chest pain none  Addressing respiratory complaints Patient is negative for  hemoptysis and cyanosis  CONSTITUTIONAL:  negative for fevers and chills      Past Medical History:     has a past medical history of Arrhythmia, Arthritis, Bladder cancer ([facility] [address] Blood circulation, collateral, CAD (coronary artery disease), Chronic kidney disease, stage III (moderate) ([address] DM (diabetes mellitus) ([facility] [address] Glaucoma, H/O Doppler ultrasound, HTN (hypertension), Hx of 24 hour EKG monitoring, Hx of cardiovascular stress test, Hx of echocardiogram, Hyperlipidemia, Murmur, Pneumonia, PVD (peripheral vascular disease) ([facility] [address] and Rhabdomyolysis. has a past surgical history that includes Bladder surgery (1990); Cholecystectomy; Hysterectomy; back surgery; Aorta surgery (07/07); Kidney surgery; Colonoscopy; Pancreas surgery; eye surgery; pacemaker placement; vascular surgery; Pacemaker insertion (03/2016); and Lung biopsy (Right). reports that she quit smoking about 21 years ago. She started smoking about 59 years ago. She has a 50.00 pack-year smoking history. She has never used smokeless tobacco. She reports that she does not drink alcohol or use drugs. Family history:  family history includes Dementia in her father; Diabetes in her mother; Heart Failure in her brother and father; Stroke in her father.     Allergies   Allergen Reactions    Vytorin [Ezetimibe-Simvastatin] Other (See Comments)     Rhabdomyolysis    Aricept [Donepezil Hcl]      Muscle cramps    Boniva [Ibandronic Acid]     Nsaids      Social History:    Reviewed; no changes    Objective:   PHYSICAL EXAM:        VITALS:  /63   Pulse 83   Temp 98.4 °F (36.9 °C) (Oral)   Resp 16   Ht 5' 3\" (1.6 m)   Wt 140 lb (63.5 kg)   SpO2 93%   BMI 24.80 kg/m²     24HR INTAKE/OUTPUT:      Intake/Output Summary (Last 24 hours) at 6/8/2019 1243  Last data

## 2019-06-08 NOTE — PROGRESS NOTES
Pt seen and examined  Feeling a little better  No chest pain, increased sob         6/3/2019 CT scan of the head without contrast:  No acute intracranial abnormality.  Chronic microvascular ischemic changes.       No cervical spine fracture or dislocation.  Moderate multilevel degenerative   changes.       Bilateral carotid bifurcation calcifications.      6/3/2019 CT cervical spine without contrast  No acute intracranial abnormality.  Chronic microvascular ischemic changes.       No cervical spine fracture or dislocation.  Moderate multilevel degenerative   changes.       Bilateral carotid bifurcation calcifications.      6/3/2019 CT scan chest without contrast  There is a 3.2 cm right upper lobe mass, which is new from 2016, and is most   compatible with a primary lung neoplasm until proven otherwise.  Further   evaluation with PET-CT and/or percutaneous needle biopsy should be considered.      6/4/2019 lung biopsyFinal Pathologic Diagnosis:  Lung mass, right upper lobe; CT guided needle biopsy:  -     HIGH-GRADE NEUROENDOCRINE CARCINOMA (SMALL CELL  CARCINOMA). Microscopic Description:  IMMUNOHISTOCHEMICAL STAIN REPORT:  Immunostains x 11 (adequate control):   CK AE1/3, CK 5/6, CK  7, Ki-67, TTF-1, P 40, Napsin A, synaptophysin,  chromogranin, CD 56 and CD45 are performed on block 2. The atypical small blue cells are positive for CK AE1/3  (perinuclear dot-like pattern), CK 7 and CD 56.  They are  negative for CK 5/6, TTF-1, Napsin A, p40, CD45 and  chromogranin.  Synaptophysin stain is noncontributory. Ki-67 stain shows more than 90% positive cells.      6/7/19: CT abdomen and pelvis:  No definite solid organ metastatic disease given the history of lung mass.       Postsurgical change from aortic aneurysm repair. Jose Mantilla is suspected severe   narrowing at the origin of the SMA       Focal nodule left kidney, not clearly a simple cyst.  Recommend further   characterization with dedicated renal CT or MRI versus close attention on   follow-up. aao x 3  ctab  s1s2  Soft ntnd bs pos     6/4/2019 CMP with a sodium of 139 potassium of 3.4 BUN of 18 creatinine 1 calcium 7.9 albumin 3.2 rest of the LFTs within normal limits. CBC with WBC 15.8 hemoglobin of 10.7 hematocrit of 36.0 MCV of 94.7 and platelets of 368     6/6/2019 CBC with WBC of 14.3 hemoglobin 11.7 hematocrit 36.9 MCV of 89.1 and platelets of 257    Right upper lobe small cell lung cancer. Discussed the findings and prognosis if LD or ED. Also discussed further staging with a CAT scan of the abdomen, MRI of the brain  And further treatment planning with concomitant chemoradiation if limited disease. Mild anemia most probably multifactorial in this patient. Probably secondary to underlying carcinoma, and anemia of elderly, anemia of chronic disease. Transfusion support as needed. Last hemoglobin 11.7.     Neutrophilia most probably secondary to underlying postobstructive pneumonia. Is on antimicrobial agents.     Improve nutritional status.     Continue other medical care.     Discussed above findings, prognosis, possible treatment plan with Mrs. Saskia Mazariegos and her  and they verbalized understanding.     LEONEL

## 2019-06-09 NOTE — PROGRESS NOTES
INTERNAL MEDICINE PROGRESS NOTE        Lc Handler   5/5/1931   Primary Care Physician:  Sean Mendoza MD  Admit Date: 6/3/2019     Subjective:   Breathing is better at this time. Weakness is also little better. She works with physical therapy. No fever, chills. No nausea, vomiting. Objective:   BP (!) 151/67   Pulse 95   Temp 98.2 °F (36.8 °C) (Oral)   Resp 16   Ht 5' 3\" (1.6 m)   Wt 140 lb (63.5 kg)   SpO2 95%   BMI 24.80 kg/m²    Recent Labs     06/08/19  1743 06/08/19  2143 06/09/19  0900 06/09/19  1253   POCGLU 231* 150* 154* 236*       I/O last 3 completed shifts: In: 110 [P.O.:100; I.V.:10]  Out: -   No intake/output data recorded. Neck: no adenopathy and supple, symmetrical, trachea midline  Lungs: clear to auscultation bilaterally  Heart: regular rate and rhythm and S1, S2 normal  Abdomen: soft, non-tender; bowel sounds normal; no masses,  no organomegaly  Extremities: extremities normal, atraumatic, no cyanosis or edema  Neurologic: non focal exam, generalized weakness    Data Review  CBC with Differential:    No results for input(s): WBC, RBC, HGB, HCT, PLT, MCV, MCH, MCHC, RDW, NRBC, SEGSPCT, BANDSPCT, BLASTSPCT, METASPCT, LYMPHOPCT, PROMYELOPCT, MONOPCT, EOSPCT, BASOPCT, MONOSABS, LYMPHSABS, EOSABS, BASOSABS, DIFFTYPE in the last 72 hours. Invalid input(s): MYELOPCT;3, ATYLMREL  CMP:    Recent Labs     06/07/19  1413   *   K 4.3   CL 93*   CO2 26   BUN 13   CREATININE 1.2*   GFRAA 51*   LABGLOM 42*   GLUCOSE 208*   PROT 7.4   LABALBU 3.8   CALCIUM 9.2   BILITOT 0.4   ALKPHOS 127   AST 20   ALT 15     PT/INR:    No results for input(s): PROTIME, INR in the last 72 hours.   Meds:    insulin glargine  20 Units Subcutaneous BID    sodium chloride flush  10 mL Intravenous 2 times per day    enoxaparin  30 mg Subcutaneous Daily    insulin lispro  0-6 Units Subcutaneous TID WC    insulin lispro  0-3 Units Subcutaneous Nightly    amLODIPine  10 mg Oral Daily    furosemide 40 mg Oral Daily    gabapentin  300 mg Oral TID    latanoprost  1 drop Both Eyes Nightly    metoprolol succinate  50 mg Oral Daily    mirtazapine  7.5 mg Oral Nightly    pantoprazole  40 mg Oral QAM AC    polyethylene glycol  17 g Oral Daily    ramipril  5 mg Oral Daily    cefTRIAXone (ROCEPHIN) IV  1 g Intravenous Daily     PRN Meds: ipratropium-albuterol, sodium chloride flush, cloNIDine, HYDROcodone-acetaminophen, potassium chloride **OR** potassium alternative oral replacement **OR** potassium chloride, acetaminophen, sodium chloride flush, magnesium hydroxide, ondansetron, glucose, dextrose, glucagon (rDNA), dextrose    Assessment/Plan:   1. Right upper lobe mass, small cell carcinoma. Discussed with oncologist as well as patient's family members ( Daughters). Waiting for MRI  2. Post obstructive pneumonia. Improving. On IV Rocephin and resp tx.   4.  Generalized weakness. Physical therapy note reviewed, may need placement. 5.  Hypertension.: in control  6. Diabetes mellitus.: mildly elevated, monitor  7. Hyperlipidemia.: stable, continue medications  8. Peripheral arterial disease. 9.  Remote history of tobacco use. Plan  Ambulate  Wait for MRI  Check blood work in am  Continue checking blood sugar and take sliding scale insulin.    Monitor     Jack Leonard MD  6/9/2019 2:42 PM

## 2019-06-09 NOTE — PROGRESS NOTES
Pulmonary and Critical Care  Progress Note    Subjective: The patient is improving. Shortness of breath none  Chest pain none  Addressing respiratory complaints Patient is negative for  hemoptysis and cyanosis  CONSTITUTIONAL:  negative for fevers and chills      Past Medical History:     has a past medical history of Arrhythmia, Arthritis, Bladder cancer ([facility] [address] Blood circulation, collateral, CAD (coronary artery disease), Chronic kidney disease, stage III (moderate) ([facility] [address] DM (diabetes mellitus) ([facility] [address] Glaucoma, H/O Doppler ultrasound, HTN (hypertension), Hx of 24 hour EKG monitoring, Hx of cardiovascular stress test, Hx of echocardiogram, Hyperlipidemia, Murmur, Pneumonia, PVD (peripheral vascular disease) ([facility] [address] and Rhabdomyolysis. has a past surgical history that includes Bladder surgery (1990); Cholecystectomy; Hysterectomy; back surgery; Aorta surgery (07/07); Kidney surgery; Colonoscopy; Pancreas surgery; eye surgery; pacemaker placement; vascular surgery; Pacemaker insertion (03/2016); and Lung biopsy (Right). reports that she quit smoking about 21 years ago. She started smoking about 59 years ago. She has a 50.00 pack-year smoking history. She has never used smokeless tobacco. She reports that she does not drink alcohol or use drugs. Family history:  family history includes Dementia in her father; Diabetes in her mother; Heart Failure in her brother and father; Stroke in her father.     Allergies   Allergen Reactions    Vytorin [Ezetimibe-Simvastatin] Other (See Comments)     Rhabdomyolysis    Aricept [Donepezil Hcl]      Muscle cramps    Boniva [Ibandronic Acid]     Nsaids      Social History:    Reviewed; no changes    Objective:   PHYSICAL EXAM:        VITALS:  BP (!) 151/67   Pulse 95   Temp 98.2 °F (36.8 °C) (Oral)   Resp 16   Ht 5' 3\" (1.6 m)   Wt 140 lb (63.5 kg)   SpO2 95%   BMI 24.80 kg/m²     24HR INTAKE/OUTPUT:      Intake/Output Summary (Last 24 hours) at 6/9/2019 1238  Last data filed at 6/9/2019 0649  Gross per 24 hour   Intake 100 ml   Output --   Net 100 ml       CONSTITUTIONAL:  awake, alert, cooperative, no apparent distress, and appears stated age  LUNGS:  decreased breath sounds. CARDIOVASCULAR:  normal S1 and S2 and negative JVD  ABD:Abdomen soft, non-tender. BS normal. No masses,  No organomegaly  NEURO:Alert and oriented x3. Gait normal. Reflexes and motor strength normal and symmetric. Cranial nerves 2-12 and sensation grossly intact. DATA:    CBC:  No results for input(s): WBC, RBC, HGB, HCT, PLT, MCV, MCH, MCHC, RDW, NRBC, SEGSPCT, BANDSPCT in the last 72 hours. BMP:  Recent Labs     06/07/19  1413   *   K 4.3   CL 93*   CO2 26   BUN 13   CREATININE 1.2*   CALCIUM 9.2   GLUCOSE 208*      ABG:  No results for input(s): PH, PO2ART, PPO7TRL, HCO3, BEART, O2SAT in the last 72 hours. Lab Results   Component Value Date    PROBNP 407.1 (H) 02/06/2018    PROBNP 350.8 (H) 05/14/2016     No results found for: [facility]    Radiology Review:  Pertinent images / reports were reviewed as a part of this visit. Assessment:     Patient Active Problem List   Diagnosis    DM (diabetes mellitus)    Hyperlipidemia    PVD (peripheral vascular disease)    HTN (hypertension)    Chronic kidney disease, stage III (moderate)    Weakness    Fever    Sepsis ([address]    Hyponatremia    Bacteremia due to Klebsiella pneumoniae    Pneumobilia    Cardiac pacemaker    DM (diabetes mellitus), secondary uncontrolled ([address]    Leg weakness, bilateral    Gait disturbance    Acute cystitis without hematuria    Pain in pacemaker pocket    Lung mass       Plan:   1. Overall the patient has improved. 2. Discussed with the family.     Micha Reagan MD  6/9/2019  12:38 PM

## 2019-06-10 NOTE — PROGRESS NOTES
[facility] Liaison spoke with pt & daughter Ann Greer. Daughter visit daily to care for pt. And is aware of discharge & will initiate [address]. Barry Britt is the spouse & they have a dog.

## 2019-06-10 NOTE — CONSULTS
Date: 6/10/2019  Referring Physician: Dr. Hosea Jerome Physician:  Luis Siegel M.D. Date of Admission: 6/3/2019  9:02 AM  Patient: Wendy Dumont is a 80 y.o. female who presents today with the complaint of fatigue, dizziness, and leg giving out on her. She also reports a 15lb weight loss. The patient was previously in a fairly good state of health, however, began having symptoms for a few weeks. After her knee gave out, the patient presented for a medical evaluation. She denies any chest pain, SOB, hemoptysis or green phlegm. A CT chest was performed revealing:   Impression   There is a 3.2 cm right upper lobe mass, which is new from 2016, and is most   compatible with a primary lung neoplasm until proven otherwise.  Further   evaluation with PET-CT and/or percutaneous needle biopsy should be considered. Brain CT:  Impression   No acute intracranial abnormality.  Chronic microvascular ischemic changes.       No cervical spine fracture or dislocation.  Moderate multilevel degenerative   changes.       Bilateral carotid bifurcation calcifications. 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       Postsurgical change from aortic aneurysm repair. 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  Recommend further   characterization with dedicated renal CT or MRI versus close attention on   follow-up. The patient presents today for consideration of the treatment options.       Past Medical History:   Diagnosis Date    Arrhythmia     Arthritis     Bladder cancer ([address]     Blood circulation, collateral     CAD (coronary artery disease)     Chronic kidney disease, stage III (moderate) ([address] 5/5/2015    DM (diabetes mellitus) ([address]     Glaucoma     H/O Doppler ultrasound 11/25/2013;12/11 11/13--PERIPHERAL arterial duplex patent aortobifem bypasses;12/11--Normal study.  HTN (hypertension)     Hx of 24 hour EKG monitoring 01-    Max heart rate 94, minimum is 45 and average is 66,  Longesy RR is 2.8 seconds                                                            Hx of cardiovascular stress test 12/11     EF 70%    Hx of echocardiogram 03/08    EF>55%    Hyperlipidemia     Murmur     Pneumonia     PVD (peripheral vascular disease) (HCC)     Rhabdomyolysis 5/5/2015       Past Surgical History:   Procedure Laterality Date    AORTA SURGERY  07/07    BACK SURGERY      [place]    CHOLECYSTECTOMY      COLONOSCOPY      EYE SURGERY      HYSTERECTOMY      KIDNEY SURGERY      left    LUNG BIOPSY Right     Dr Lagunas Him  03/2016    PACEMAKER PLACEMENT      [facility]    PANCREAS SURGERY      VASCULAR SURGERY         Allergies:  Vytorin [ezetimibe-simvastatin]; Aricept [donepezil hcl];  Boniva [ibandronic acid]; and Nsaids    Meds:    Current Facility-Administered Medications:     traZODone (DESYREL) tablet 50 mg, 50 mg, Oral, Nightly, Jack Elina Chew MD, 50 mg at 06/09/19 2251    ipratropium-albuterol (DUONEB) nebulizer solution 1 ampule, 1 ampule, Inhalation, Q4H PRN, Familia Mast MD    sodium chloride flush 0.9 % injection 10 mL, 10 mL, Intravenous, ONCE PRN, Familia Mast MD    insulin glargine (LANTUS) injection vial 20 Units, 20 Units, Subcutaneous, BID, Guillermina Ryan MD, 20 Units at 06/09/19 2118    cloNIDine (CATAPRES) tablet 0.1 mg, 0.1 mg, Oral, Q6H PRN, Guillermina Ryan MD, 0.1 mg at 06/09/19 2143    HYDROcodone-acetaminophen (NORCO) 5-325 MG per tablet 1 tablet, 1 tablet, Oral, Q6H PRN, Guillermina Ryan MD, 1 tablet at 06/07/19 2037    potassium chloride (KLOR-CON M) extended release tablet 40 mEq, 40 mEq, Oral, PRN, 40 mEq at 06/04/19 1156 **OR** potassium chloride (KLOR-CON) packet 40 mEq, 40 mEq, Oral, PRN **OR** potassium chloride 10 mEq/100 mL IVPB (Peripheral Line), 10 mEq, Intravenous, PRN, Familia Boyce MD    acetaminophen (TYLENOL) tablet 650 mg, 650 mg, Oral, Q6H PRN, Andree Stafford MD, 650 mg at 06/09/19 1041    sodium chloride flush 0.9 % injection 10 mL, 10 mL, Intravenous, 2 times per day, Andree Stafford MD, 10 mL at 06/10/19 0913    sodium chloride flush 0.9 % injection 10 mL, 10 mL, Intravenous, PRN, Andree Stafford MD    magnesium hydroxide (MILK OF MAGNESIA) 400 MG/5ML suspension 30 mL, 30 mL, Oral, Daily PRN, Andree Stafford MD    ondansetron [facility]) injection 4 mg, 4 mg, Intravenous, Q6H PRN, Andree Stafford MD    enoxaparin (LOVENOX) injection 30 mg, 30 mg, Subcutaneous, Daily, Andree Stafford MD, 30 mg at 06/09/19 1539    glucose (GLUTOSE) 40 % oral gel 15 g, 15 g, Oral, PRN, Andree Stafford MD, 15 g at 06/06/19 1021    dextrose 50 % IV solution, 12.5 g, Intravenous, PRN, Andree Stafford MD    glucagon (rDNA) injection 1 mg, 1 mg, Intramuscular, PRN, Andree Stafford MD    dextrose 5 % solution, 100 mL/hr, Intravenous, PRN, Andree Stafford MD    insulin lispro (HUMALOG) injection vial 0-6 Units, 0-6 Units, Subcutaneous, TID WC, Andree Stafford MD, 3 Units at 06/10/19 1314    insulin lispro (HUMALOG) injection vial 0-3 Units, 0-3 Units, Subcutaneous, Nightly, Andree Stafford MD, 1 Units at 06/09/19 2118    amLODIPine (NORVASC) tablet 10 mg, 10 mg, Oral, Daily, Andree Stafford MD, 10 mg at 06/10/19 0913    furosemide (LASIX) tablet 40 mg, 40 mg, Oral, Daily, Andree Stafford MD, 40 mg at 06/10/19 0913    gabapentin (NEURONTIN) capsule 300 mg, 300 mg, Oral, TID, Andree Stafford MD, 300 mg at 06/10/19 1315    latanoprost (XALATAN) 0.005 % ophthalmic solution 1 drop, 1 drop, Both Eyes, Nightly, Andree Stafford MD, 1 drop at 06/09/19 2117    metoprolol succinate (TOPROL XL) extended release tablet 50 mg, 50 mg, Oral, Daily, Andree Stafford MD, 50 mg at 06/10/19 0913   Stroke Father     Diabetes Mother     Heart Failure Brother         CVA       Review of Systems:    Constitutional:  Patient denies fevers, rigors, or drenching night sweats. Eyes:  The patient denies any recent visual changes, diplopia  Respiratory:  The patient denies any SOB, hemoptysis, or green sputum production  Cardiovascular: The patient denies any chest pain, leg edema, or fainting spells  GI:  Patient denies nausea, vomiting, diarrhea, melena, or hematochezia  : Patient denies dysuria, hematuria, or urinary incontinence. Integumentary: patient denies skin rashes, pruritis, or arm edema  Endocrine:  Patient denies any thyroid problems. See PMH  Neurologic: Patient denies focal weakness, or disorientation. Physical Exam:    Vitals:    Vitals:    06/10/19 0447   BP: (!) 148/63   Pulse: 98   Resp: 16   Temp: 97.7 °F (36.5 °C)   SpO2: 93%     HEENT:  Pupils are round and symmetric. Sclerae anicteric. Neck: Supple. No cervical, supraclavicular, or infraclavicular lymphadenopathy is palpable. Heart: Regular rate and rhythm. No murmurs, clicks, or gallops are present. Lungs: Bilaterally clear to auscultation. No rales, rhonci, or wheezing are present. Abdomen: Normoactive bowels sounds are present. Soft. Non-tender and non-distended. No hepatosplenomegaly or masses are present. Extremities:  No cyanosis, clubbing or edema is present. Neurologic:  The patient is alert and oriented. CN II-XII are grossly intact. Sensation to light touch is intact and symmetric in the fingers and feet. Muscle strength is 5/5 in both the upper and lower extremities.     Labs:   6/10/2019  7:01 AM - [facility] Incoming Lab Results From [facility] ([email or web address])     Component Value Ref Range & Units Status Collected Lab   Sodium 140  135 - 145 MMOL/L Final 06/10/2019  5:17 AM [facility]   Potassium 4.0  3.5 - 5.1 MMOL/L Final 06/10/2019  5:17 AM [facility]  5:17 AM [facility]   Hematocrit 36. 0Low   37 - 47 % Final 06/10/2019  5:17 AM [facility]   MCV 95.7  78 - 100 FL Final 06/10/2019  5:17 AM [facility] 28.7  27 - 31 PG Final 06/10/2019  5:17 AM [facility]   MCHC 30.0Low   32.0 - 36.0 % Final 06/10/2019  5:17 AM [facility]   RDW 12.4  11.7 - 14.9 % Final 06/10/2019  5:17 AM [facility]   Platelets 475  340 - 440 K/CU MM Final 06/10/2019  5:17 AM [facility]   MPV 10.4  7.5 - 11.1 FL Final 06/10/2019  5:17 AM [facility]   Differential Type   Final 06/10/2019  5:17 AM [facility]   AUTOMATED DIFFERENTIAL    Segs Relative 73. 4High   36 - 66 % Final 06/10/2019  5:17 AM [facility]   Lymphocytes % 13.1Low   24 - 44 % Final 06/10/2019  5:17 AM [facility]   Monocytes % 9.6High   0 - 4 % Final 06/10/2019  5:17 AM [facility]   Eosinophils % 2.6  0 - 3 % Final 06/10/2019  5:17 AM [facility]   Basophils % 0.7  0 - 1 % Final 06/10/2019  5:17 AM [facility]   Segs Absolute 7.9  K/CU MM Final 06/10/2019  5:17 AM [facility]   Lymphocytes # 1.4  K/CU MM Final 06/10/2019  5:17 AM [facility]   Monocytes # 1.0  K/CU MM Final 06/10/2019  5:17 AM [facility]   Eosinophils # 0.3  K/CU MM Final 06/10/2019  5:17 AM [facility]   Basophils # 0.1  K/CU MM Final 06/10/2019  5:17 AM [facility]   Nucleated RBC % 0.0  % Final 06/10/2019  5:17 AM [facility]   Total Nucleated RBC 0.0  K/CU MM Final 06/10/2019  5:17 AM [address] [place] [address]    Total Immature Neutrophil 0.06  K/CU MM Final 06/10/2019  5:17 AM  - [facility]   Immature Neutrophil % 0.6High   0 - 0.43 % Final 06/10/2019  5:17 AM        Impression:  Saleem Alva is a 80 y.o. female with an ECOG PS generally of 1 who was recently found to have what appears to be a limited stage small cell lung cancer. I have reviewed the patient's radiographic images. The treatment options were discussed in detail with the patient. I do recommend definitive concurrent chemo- radiation therapy. The potential acute side effects and chronic complications of radiation therapy to the chest were discussed in detail with the patient. The patient voiced understanding, and the patient's questions were answered. The patient has decided to proceed with radiation therapy. I will  schedule a simulation to occur at the next available time and will plan on initiating radiation therapy shortly thereafter. Thank-you for allowing me to participate in the care of this very pleasant patient. Mali Ryan M.D.   Radiation Oncology

## 2019-06-10 NOTE — PROGRESS NOTES
Late entry note  Continues to feel a little better  No cough  No fever  oob and ambulated yesterday    aao x 3  ctab  s1s2  Soft ntnd bs pos       Right upper lobe small cell lung cancer.  Looks like LD. Recommend concomitant CXRT with carbo/etoposide. Discussed ae briefly.  Will need port placement     Mild anemia most probably multifactorial in this patient.  Probably secondary to underlying carcinoma, and anemia of elderly, anemia of chronic disease.  Transfusion support as needed.  Last hemoglobin 11.7.     Continue to follow as OP if d/c.     LEONEL

## 2019-06-10 NOTE — DISCHARGE SUMMARY
[facility]  Discharge Summary     Patient ID  Deshawn Lewis   5/5/1931  [id number]          Admit date: 6/3/2019   Discharge date: 6/10/2019      Admitting Physician: Henderson Snellen, MD   Discharge Physician: Henderson Snellen MD    Discharge Diagnoses:     1.  Right upper lobe mass, small cell carcinoma. As per hem/onc (Dr Gayle Whyte) to start chemo and radiation tx as out patient. 2.  Post obstructive pneumonia. Improving. Treated with IV Rocephin and resp tx. 3.  Hyponatremia. Resolved. 4.  Generalized weakness. 5.  Hypertension. 6.  Diabetes mellitus. 7.  Hyperlipidemia. 8.  Peripheral arterial disease. 9.  Remote history of tobacco use. Discharged Condition: good    Hospital Course: The patient is an 80-year-old   female who presented to the emergency room earlier today with complaints  of generalized weakness and fatigue. This morning, she felt very weak  in her legs and she slipped and fell. She did not injure herself. She  was brought to the emergency room and the patient was seen and evaluated  by Dr. Jessica Ng. The patient had an extensive testing done. Chest  x-ray revealed that she had a right upper lobe mass. She underwent CT  scan of the chest, which revealed 3.2 cm right upper lobe mass, which is  new as compared to the previous CT scan of 2016. The patient's white  cell count is also noted to be high. The patient is admitted for  further evaluation and treatment. Pulmonary has been consulted. The  patient denies any fever, chills, chest pain, nausea, vomiting, weight  loss, hemoptysis, dysuria, frequency, or hematuria. Pt had lung biopsy and it is positive for small cell cancer. Seen by Dr Gayle Whyte. Pt is agreeable for further treatment. Also treated for post obstructive pneumonia. Seen by Dr Malcolm Hernandez.   DC to home in a stable condition.            Consults:     pulmonary/intensive care (Dr Malcolm Hernandez) and hematology/oncology (Dr Gayle Whyte)     Significant Diagnostic Studies:     LABORATORY STUDIES AND X-RAY FINDINGS:      Metabolic panel revealed sodium  130, potassium 4.2, chloride 95, CO2 23, BUN 27, creatinine 1.3, glucose  165, calcium 8.6, albumin 3.3, and protein 6.8. Bilirubin, AST, ALT,  and alkaline phosphatase are normal.  Troponin T is less than 0.010. On  the CBC, white cell count is 21.9, hemoglobin 11.0, hematocrit 35.3,  platelet count 583,631, segments 84%, and lymphocytes 8%.     Electrocardiogram reveals sinus rhythm with first-degree AV block,  septal infarct. Xr Chest Standard (2 Vw)    Result Date: 6/3/2019  EXAMINATION: TWO XRAY VIEWS OF THE CHEST 6/3/2019 9:29 am COMPARISON: 02/06/2018, 05/14/2016 HISTORY: ORDERING SYSTEM PROVIDED HISTORY: Chest pain TECHNOLOGIST PROVIDED HISTORY: Reason for exam:->Chest pain Ordering Physician Provided Reason for Exam: chest pain Acuity: Acute Type of Exam: Initial Mechanism of Injury: pt fell this morn FINDINGS: Pacemaker wires. Masslike density in the suprahilar right lung, measuring 3.4 x 3.3 cm. Focal consolidations. No pleural effusion or pneumothorax. Heart size is at the upper limits of normal.     New 3.4 x 3.3 cm masslike density along the suprahilar right lung. Further evaluation with contrast-enhanced CT of the chest is recommended. Xr Pelvis (1-2 Views)    Result Date: 6/5/2019  EXAMINATION: ONE X-RAY VIEW OF THE PELVIS, 6/3/2019 9:29 am COMPARISON: CT abdomen/pelvis 02/19/2016, lumbar plain films 01/04/2013 HISTORY: ORDERING SYSTEM PROVIDED HISTORY: Trauma TECHNOLOGIST PROVIDED HISTORY: Reason for exam:->Trauma Ordering Physician Provided Reason for Exam: Pelvis pain Acuity: Acute Type of Exam: Initial Mechanism of Injury: Lorilee Emperor this morning FINDINGS: A single frontal view of the pelvis was performed. There is no radiographic evidence of an acute fracture or dislocation. There is mild symmetric bilateral hip osteoarthritis. The pelvic ring is intact.   There is mild degenerative change of the bilateral SI joints. The sacrum is intact. No destructive osseous lesion is seen. There is fusion hardware at the lumbosacral junction. There are surgical clips overlying the pelvis. Mild atherosclerotic calcifications are evident. 1. No acute radiographic abnormality of the osseous pelvis. 2. Mild osteoarthritis at the bilateral hip and SI joints. Xr Femur Left (min 2 Views)    Result Date: 6/7/2019  EXAMINATION: ONE XRAY VIEW OF THE PELVIS AND TWO XRAY VIEWS LEFT HIP; 2 XRAY VIEWS OF THE LEFT FEMUR 6/6/2019 2:50 pm COMPARISON: 06/03/2019 HISTORY: ORDERING SYSTEM PROVIDED HISTORY: Left leg pain TECHNOLOGIST PROVIDED HISTORY: Reason for exam:->Left leg pain Ordering Physician Provided Reason for Exam: Left leg pain Acuity: Unknown Type of Exam: Unknown Follow-up exam FINDINGS: Pelvis/left hip: Diffuse osteopenia. Postsurgical changes at L5-S1. Mild to moderate degenerative changes of the SI joints and bilateral hip joints. The pelvic ring is intact. Multiple surgical clips are noted in the pelvis. There is a tiny calcific density adjacent to the right greater trochanter likely reflecting calcific tendinopathy. No acute fracture or dislocation. Vascular calcifications. Left femur: Osteopenia. Mild degenerative changes of the left hip joint. Vascular calcifications. No knee joint effusion. No acute fracture or dislocation. 1. No acute osseous abnormality in the pelvis, left hip or left femur. Given the degree of osteopenia, nondisplaced fractures may be radiographically occult. If pain or concern for fracture persists, consider MR imaging. 2. Mild to moderate degenerative changes in the bilateral hips and bilateral SI joints. 3. Postsurgical changes at L5-S1.      Ct Head Wo Contrast    Result Date: 6/3/2019  EXAMINATION: CT OF THE HEAD WITHOUT CONTRAST; CT OF THE CERVICAL SPINE WITHOUT CONTRAST 6/3/2019 9:51 am TECHNIQUE: CT of the head was performed without the administration of intravenous contrast. Dose modulation, iterative reconstruction, and/or weight based adjustment of the mA/kV was utilized to reduce the radiation dose to as low as reasonably achievable.; CT of the cervical spine was performed without the administration of intravenous contrast. Multiplanar reformatted images are provided for review. Dose modulation, iterative reconstruction, and/or weight based adjustment of the mA/kV was utilized to reduce the radiation dose to as low as reasonably achievable. COMPARISON: None. HISTORY: ORDERING SYSTEM PROVIDED HISTORY: HEAD TRAUMA, CLOSED, MILD, GCS >= 13, NO RISK FACTORS, NEURO EXAM NORMAL TECHNOLOGIST PROVIDED HISTORY: Has a \"code stroke\" or \"stroke alert\" been called? ->No Ordering Physician Provided Reason for Exam: Head trauma, closed, mild, GCS >= 13, no risk factors, neuro exam normal Acuity: Acute Type of Exam: Initial Mechanism of Injury: fall Relevant Medical/Surgical History: none FINDINGS: BRAIN/VENTRICLES: There is no acute intracranial hemorrhage, mass effect or midline shift. No abnormal extra-axial fluid collection. Lucencies within the periventricular and subcortical white matter likely represent chronic microvascular ischemic changes. The gray-white differentiation is maintained without evidence of an acute infarct. There is no evidence of hydrocephalus. ORBITS: The visualized portion of the orbits demonstrate no acute abnormality. SINUSES: The visualized paranasal sinuses and mastoid air cells demonstrate no acute abnormality. SOFT TISSUES/SKULL:  No acute abnormality of the visualized skull or soft tissues. CERVICAL SPINE: No cervical spine fracture dislocation. Vertebral body height and alignment within normal limits. Normal cervical spine curvature. Multilevel degenerative changes within the cervical spine with bilateral uncovertebral hypertrophy at C5-C6 and C6-C7 with mild posterior disc osteophyte complexes at this level.   Bilateral carotid bifurcation calcifications. Lung apices are clear. No acute intracranial abnormality. Chronic microvascular ischemic changes. No cervical spine fracture or dislocation. Moderate multilevel degenerative changes. Bilateral carotid bifurcation calcifications. Ct Chest Wo Contrast    Result Date: 6/3/2019  EXAMINATION: CT OF THE CHEST WITHOUT CONTRAST 6/3/2019 10:36 am TECHNIQUE: CT of the chest was performed without the administration of intravenous contrast. Multiplanar reformatted images are provided for review. Dose modulation, iterative reconstruction, and/or weight based adjustment of the mA/kV was utilized to reduce the radiation dose to as low as reasonably achievable. COMPARISON: 06/03/2019, 05/14/2016 HISTORY: ORDERING SYSTEM PROVIDED HISTORY: mass on CXR TECHNOLOGIST PROVIDED HISTORY: Ordering Physician Provided Reason for Exam: mass on CXR Acuity: Acute Type of Exam: Initial Additional signs and symptoms: patient unable to raise arms Relevant Medical/Surgical History: hx of bladder cancer FINDINGS: Mediastinum: The heart is enlarged. There is no pericardial effusion. A left subclavian pacemaker is in place. There are severe coronary artery calcifications. There is severe atherosclerosis of the thoracic aorta. No aneurysm is demonstrated. Top-normal size mediastinal lymph nodes measure up to 9 mm, stable from 2016, and therefore most likely reactive. The hilar regions are suboptimally evaluated without intravenous contrast.  There is a small hiatal hernia. Lungs/pleura: There is mild emphysema. There is a 2.5 x 3.2 cm mass in the anterior right upper lobe, which extends to the pleural surface, and corresponds to the recent radiographic finding. No focal infiltrate, pleural effusion or pneumothorax is demonstrated. Upper Abdomen: Visualized portions of the upper abdomen demonstrate stable pneumobilia, consistent with prior sphincterotomy. No adrenal mass.  Soft Tissues/Bones: No suspicious visualized paranasal sinuses and mastoid air cells demonstrate no acute abnormality. SOFT TISSUES/SKULL:  No acute abnormality of the visualized skull or soft tissues. CERVICAL SPINE: No cervical spine fracture dislocation. Vertebral body height and alignment within normal limits. Normal cervical spine curvature. Multilevel degenerative changes within the cervical spine with bilateral uncovertebral hypertrophy at C5-C6 and C6-C7 with mild posterior disc osteophyte complexes at this level. Bilateral carotid bifurcation calcifications. Lung apices are clear. No acute intracranial abnormality. Chronic microvascular ischemic changes. No cervical spine fracture or dislocation. Moderate multilevel degenerative changes. Bilateral carotid bifurcation calcifications. Ct Abdomen Pelvis W Iv Contrast Additional Contrast? None    Result Date: 6/7/2019  EXAMINATION: CT OF THE ABDOMEN AND PELVIS WITH CONTRAST 6/7/2019 4:03 pm TECHNIQUE: CT of the abdomen and pelvis was performed with the administration of intravenous contrast. Multiplanar reformatted images are provided for review. Dose modulation, iterative reconstruction, and/or weight based adjustment of the mA/kV was utilized to reduce the radiation dose to as low as reasonably achievable. COMPARISON: 02/19/2016 HISTORY: ORDERING SYSTEM PROVIDED HISTORY: r/o mets TECHNOLOGIST PROVIDED HISTORY: Additional Contrast?->None Ordering Physician Provided Reason for Exam: evaluate for mets Acuity: Unknown Type of Exam: Initial Additional signs and symptoms: 40 cc isovue 370,, infiltrated @ 40 cc Relevant Medical/Surgical History: hx hyste, rosaura back sx dm FINDINGS: Lower Chest: De pendant opacity is seen at the lung bases, likely atelectasis. Calcified granuloma seen in the lingula. Calcified granuloma seen in the right middle lobe There is streak artifact from pacer. Aortic valve calcification is seen Organs: Calcified granuloma seen within the spleen.   Right adrenal gland is normal.  Left adrenal gland is normal. There is a lobular contour left kidney. Hypodense nodule seen in the mid pole the left kidney measuring 10 mm, incompletely characterized. This is not clearly a simple cyst There is mild pelvicaliectasis on the right Diffuse fatty infiltration is seen throughout the liver. There is pneumobilia. No peripancreatic fluid. .  There are changes from prior hepaticoduodenectomy. GI/Bowel: Moderate stool seen throughout the colon. Postsurgical change seen in the right lower quadrant. Bowel is patulous at the anastomotic site. Scattered colonic diverticula are seen Pelvis: Bladder is distended. Gas is seen within the bladder. No pelvic adenopathy. Peritoneum/Retroperitoneum: There is likely severe narrowing of the SMA. Postsurgical changes seen from aortic aneurysm repair. Limbs extend into the iliac arteries. Bones/Soft Tissues: Spurring is seen in the spine. There is anterolisthesis of L5 on S1     No definite solid organ metastatic disease given the history of lung mass. Postsurgical change from aortic aneurysm repair. There is suspected severe narrowing at the origin of the SMA Focal nodule left kidney, not clearly a simple cyst.  Recommend further characterization with dedicated renal CT or MRI versus close attention on follow-up. Xr Chest Portable    Result Date: 6/4/2019  EXAMINATION: ONE XRAY VIEW OF THE CHEST 6/4/2019 10:40 am COMPARISON: 06/03/2019 HISTORY: ORDERING SYSTEM PROVIDED HISTORY: s/p rt lung biopsy, eval for ptx TECHNOLOGIST PROVIDED HISTORY: Reason for exam:->s/p rt lung biopsy, eval for ptx Ordering Physician Provided Reason for Exam: s/p rt lung biopsy, eval for ptx Acuity: Acute Type of Exam: Initial FINDINGS: Stable 3.3 cm right upper lobe pulmonary mass. No pneumothorax. No pleural effusion. Stable cardiac silhouette. No overt pulmonary edema. Calcified granulomas in the left lung. Stable cardiac pacer.   The osseous structures are stable. No pneumothorax. Ct Needle Biopsy Lung Percutaneous    Result Date: 6/4/2019  EXAMINATION: CT-guided right lung core biopsy  6/4/2019 10:42 am TECHNIQUE: After informed consent patient was brought to CT scanner and placed supine on the table. Scanning through the lungs localize the side lung mass. The patient was prepped and draped in sterile fashion. 1% lidocaine was infiltrated for local anesthesia. A 20-gauge coaxial needle was advanced under CT guidance into the mass. Multiple 20-gauge core biopsies were obtained. Samples were submitted to pathology for analysis. Post biopsy scanning shows no immediate pneumothorax. Dressing was applied. Patient was returned to holding in stable condition. HISTORY: ORDERING SYSTEM PROVIDED HISTORY: lung mass TECHNOLOGIST PROVIDED HISTORY: Reason for exam:->lung mass Ordering Physician Provided Reason for Exam: RIGHT LUNG MASS Acuity: Acute Type of Exam: Initial Additional signs and symptoms: NONE Relevant Medical/Surgical History: **ATTN: DR. PATRICIA** SEDATION: 1 mg Versed and 50 mcg fentanyl     Successful CT-guided core biopsy of side lung mass. 6 core samples were obtained as well as 1 fine-needle aspiration. Sample tissue adequacy was verified by pathologist who was present at bedside. Mri Brain W Wo Contrast    Result Date: 6/10/2019  EXAMINATION: MRI OF THE BRAIN WITHOUT AND WITH CONTRAST  6/10/2019 7:56 am TECHNIQUE: Multiplanar multisequence MRI of the head/brain was performed without and with the administration of intravenous contrast. COMPARISON: None. HISTORY: ORDERING SYSTEM PROVIDED HISTORY: r/o brain mets TECHNOLOGIST PROVIDED HISTORY: Ordering Physician Provided Reason for Exam: weakness//6 ml multihance Initial evaluation. FINDINGS: Motion degrades images limiting evaluation. INTRACRANIAL STRUCTURES/VENTRICLES:  There is no evidence of an acute infarct.   There appears to be minimal encephalomalacia involving the occipital density adjacent to the right greater trochanter likely reflecting calcific tendinopathy. No acute fracture or dislocation. Vascular calcifications. Left femur: Osteopenia. Mild degenerative changes of the left hip joint. Vascular calcifications. No knee joint effusion. No acute fracture or dislocation. 1. No acute osseous abnormality in the pelvis, left hip or left femur. Given the degree of osteopenia, nondisplaced fractures may be radiographically occult. If pain or concern for fracture persists, consider MR imaging. 2. Mild to moderate degenerative changes in the bilateral hips and bilateral SI joints.  3. Postsurgical changes at L5-S1.   -    Recent Results (from the past 24 hour(s))   POCT Glucose    Collection Time: 06/09/19 12:53 PM   Result Value Ref Range    POC Glucose 236 (H) 70 - 99 MG/DL   POCT Glucose    Collection Time: 06/09/19  5:36 PM   Result Value Ref Range    POC Glucose 154 (H) 70 - 99 MG/DL   POCT Glucose    Collection Time: 06/09/19  9:14 PM   Result Value Ref Range    POC Glucose 162 (H) 70 - 99 MG/DL   CBC Auto Differential    Collection Time: 06/10/19  5:17 AM   Result Value Ref Range    WBC 10.7 (H) 4.0 - 10.5 K/CU MM    RBC 3.76 (L) 4.2 - 5.4 M/CU MM    Hemoglobin 10.8 (L) 12.5 - 16.0 GM/DL    Hematocrit 36.0 (L) 37 - 47 %    MCV 95.7 78 - 100 FL    MCH 28.7 27 - 31 PG    MCHC 30.0 (L) 32.0 - 36.0 %    RDW 12.4 11.7 - 14.9 %    Platelets 707 911 - 533 K/CU MM    MPV 10.4 7.5 - 11.1 FL    Differential Type AUTOMATED DIFFERENTIAL     Segs Relative 73.4 (H) 36 - 66 %    Lymphocytes % 13.1 (L) 24 - 44 %    Monocytes % 9.6 (H) 0 - 4 %    Eosinophils % 2.6 0 - 3 %    Basophils % 0.7 0 - 1 %    Segs Absolute 7.9 K/CU MM    Lymphocytes # 1.4 K/CU MM    Monocytes # 1.0 K/CU MM    Eosinophils # 0.3 K/CU MM    Basophils # 0.1 K/CU MM    Nucleated RBC % 0.0 %    Total Nucleated RBC 0.0 K/CU MM    Total Immature Neutrophil 0.06 K/CU MM    Immature Neutrophil % 0.6 (H) 0 - 0.43 % Comprehensive Metabolic Panel    Collection Time: 06/10/19  5:17 AM   Result Value Ref Range    Sodium 140 135 - 145 MMOL/L    Potassium 4.0 3.5 - 5.1 MMOL/L    Chloride 101 99 - 110 mMol/L    CO2 27 21 - 32 MMOL/L    BUN 13 6 - 23 MG/DL    CREATININE 1.0 0.6 - 1.1 MG/DL    Glucose 49 (LL) 70 - 99 MG/DL    Calcium 8.8 8.3 - 10.6 MG/DL    Alb 3.2 (L) 3.4 - 5.0 GM/DL    Total Protein 6.1 (L) 6.4 - 8.2 GM/DL    Total Bilirubin 0.4 0.0 - 1.0 MG/DL    ALT 11 10 - 40 U/L    AST 24 15 - 37 IU/L    Alkaline Phosphatase 108 40 - 129 IU/L    GFR Non- 52 (L) >60 mL/min/1.73m2    GFR African American >60 >60 mL/min/1.73m2    Anion Gap 12 4 - 16   POCT Glucose    Collection Time: 06/10/19  7:23 AM   Result Value Ref Range    POC Glucose 79 70 - 99 MG/DL        Disposition: home    Patient Instructions:    Lisette Doyle   Home Medication Instructions AN    Printed on:06/10/19 1102   Medication Information                      albuterol sulfate  (90 Base) MCG/ACT inhaler  Inhale 2 puffs into the lungs 4 times daily as needed for Wheezing             amLODIPine (NORVASC) 10 MG tablet  Take 1 tablet by mouth daily             aspirin 81 MG EC tablet  Take 81 mg by mouth daily. cefUROXime (CEFTIN) 250 MG tablet  Take 1 tablet by mouth 2 times daily for 10 days             Dulaglutide (TRULICITY) 7.69 DV/1.5HY SOPN  Inject into the skin once a week             furosemide (LASIX) 40 MG tablet  Take 0.5 tablets by mouth daily             gabapentin (NEURONTIN) 300 MG capsule  Take 300 mg by mouth 3 times daily.              Insulin Aspart (NOVOLOG FLEXPEN SC)  Inject into the skin 3 times daily Patient is on a sliding scale              insulin detemir (LEVEMIR FLEXPEN) 100 UNIT/ML injection pen  Inject 20 Units into the skin nightly 40 units in the morning and 30 units in the evening             latanoprost (XALATAN) 0.005 % ophthalmic solution  Place 1 drop into both eyes nightly metoprolol succinate (TOPROL XL) 50 MG extended release tablet  Take 50 mg by mouth daily              mirtazapine (REMERON) 15 MG tablet  Take 7.5 mg by mouth nightly              omeprazole (PRILOSEC) 20 MG capsule  Take 20 mg by mouth daily.              polyethylene glycol (GLYCOLAX) packet  Take 17 g by mouth daily             ramipril (ALTACE) 5 MG capsule  Take 5 mg by mouth daily             traZODone (DESYREL) 50 MG tablet  Take 1 tablet by mouth nightly                  Diet: DIET CARB CONTROL; Carb Control: 4 carb choices (60 gms)/meal  Dietary Nutrition Supplements: Diabetic Oral Supplement    Follow-up with Cecile Garcia in 7 days    Signed: Cecile Garcia    Time spent on discharge 35 minutes

## 2019-06-10 NOTE — PROGRESS NOTES
0.4 0.4   ALKPHOS 127 108   AST 20 24   ALT 15 11     PT/INR:    No results for input(s): PROTIME, INR in the last 72 hours. Meds:    traZODone  50 mg Oral Nightly    insulin glargine  20 Units Subcutaneous BID    sodium chloride flush  10 mL Intravenous 2 times per day    enoxaparin  30 mg Subcutaneous Daily    insulin lispro  0-6 Units Subcutaneous TID WC    insulin lispro  0-3 Units Subcutaneous Nightly    amLODIPine  10 mg Oral Daily    furosemide  40 mg Oral Daily    gabapentin  300 mg Oral TID    latanoprost  1 drop Both Eyes Nightly    metoprolol succinate  50 mg Oral Daily    mirtazapine  7.5 mg Oral Nightly    pantoprazole  40 mg Oral QAM AC    polyethylene glycol  17 g Oral Daily    ramipril  5 mg Oral Daily    cefTRIAXone (ROCEPHIN) IV  1 g Intravenous Daily     PRN Meds: ipratropium-albuterol, sodium chloride flush, cloNIDine, HYDROcodone-acetaminophen, potassium chloride **OR** potassium alternative oral replacement **OR** potassium chloride, acetaminophen, sodium chloride flush, magnesium hydroxide, ondansetron, glucose, dextrose, glucagon (rDNA), dextrose    Assessment/Plan:   1. Right upper lobe mass, small cell carcinoma. As per hem/onc  2. Post obstructive pneumonia. Improving. On IV Rocephin and resp tx. 3.  Hyponatremia. Resolved. 4.  Generalized weakness. 5.  Hypertension. 6.  Diabetes mellitus. 7.  Hyperlipidemia. 8.  Peripheral arterial disease. 9.  Remote history of tobacco use. 10. Arrange DC to home. 6. Garret Bergeron was evaluated today and a DME order was entered for a semi-electric hospital bed because she requires assistance for positioning needs not possible in an ordinary bed. Patient requires frequent and immediate positioning changes for relief of pain and care needs related to medical diagnoses.   Patient needs variability of bed height requirements to perform patient transfers and for eating, bathing, toileting, personal cares, ambulating, hygiene, dressing upper body and dressing lower body. Current body Weight: 140 lb (63.5 kg). Height 5'3. The need for this equipment was discussed with the patient and she understands and is in agreement.                   Leon Coello MD  6/10/2019 10:51 AM

## 2019-06-10 NOTE — CARE COORDINATION
Silvio Pham was evaluated today and a DME order was entered for a semi-electric hospital bed because she requires assistance for positioning needs not possible in an ordinary bed. Patient requires frequent and immediate positioning changes for relief of pain and care needs related to medical diagnoses. Patient needs variability of bed height requirements to perform patient transfers and for eating, bathing, toileting, personal cares, ambulating, hygiene, dressing upper body and dressing lower body. Current body Weight: 140 lb (63.5 kg). Height 5'3. The need for this equipment was discussed with the patient and she understands and is in agreement. Talked with patient and daughter Savanah Infante at bedside. The patient wants to go home w/spouse, daughter and [facility]. She wants a hospital bed delivered as well. She has a bedside commode, a walker and everything else she needs except the hospital bed. They are going to start chemo and radiation for the small cell cancer and are hopeful. The patients she was very independent prior to this hospital stay and she plans to go back home and do as much as she can for herself. DME order for bed was faxed to  [facility] and the patient states she can d/c home before the bed is delivered. PS sent to [facility] has received the fax for the hospital bed and Renata Kiser has talked to the patient about [facility].

## 2019-06-10 NOTE — PROGRESS NOTES
Pulmonary and Critical Care  Progress Note    Subjective: The patient is better. Shortness of breath none. Chest pain none. Addressing respiratory complaints Patient is negative for  hemoptysis and cyanosis  CONSTITUTIONAL:  negative for fevers and chills      Past Medical History:     has a past medical history of Arrhythmia, Arthritis, Bladder cancer ([facility] [address] Blood circulation, collateral, CAD (coronary artery disease), Chronic kidney disease, stage III (moderate) ([facility] [address] DM (diabetes mellitus) ([facility] [address] Glaucoma, H/O Doppler ultrasound, HTN (hypertension), Hx of 24 hour EKG monitoring, Hx of cardiovascular stress test, Hx of echocardiogram, Hyperlipidemia, Murmur, Pneumonia, PVD (peripheral vascular disease) ([facility] [address] and Rhabdomyolysis. has a past surgical history that includes Bladder surgery (1990); Cholecystectomy; Hysterectomy; back surgery; Aorta surgery (07/07); Kidney surgery; Colonoscopy; Pancreas surgery; eye surgery; pacemaker placement; vascular surgery; Pacemaker insertion (03/2016); and Lung biopsy (Right). reports that she quit smoking about 21 years ago. She started smoking about 59 years ago. She has a 50.00 pack-year smoking history. She has never used smokeless tobacco. She reports that she does not drink alcohol or use drugs. Family history:  family history includes Dementia in her father; Diabetes in her mother; Heart Failure in her brother and father; Stroke in her father.     Allergies   Allergen Reactions    Vytorin [Ezetimibe-Simvastatin] Other (See Comments)     Rhabdomyolysis    Aricept [Donepezil Hcl]      Muscle cramps    Boniva [Ibandronic Acid]     Nsaids      Social History:    Reviewed; no changes    Objective:   PHYSICAL EXAM:        VITALS:  BP (!) 148/63   Pulse 98   Temp 97.7 °F (36.5 °C) (Oral)   Resp 16   Ht 5' 3\" (1.6 m)   Wt 140 lb (63.5 kg)   SpO2 93%   BMI 24.80 kg/m²     24HR INTAKE/OUTPUT:      Intake/Output Summary (Last 24 hours) at 6/10/2019 1119  Last

## 2019-06-10 NOTE — PROGRESS NOTES
Occupational Therapy    On approach at 1415 hrs, pt had discharged.      Electronically signed by:    ALL Lynn  6/10/2019, 1:07 PM

## 2019-06-10 NOTE — PROGRESS NOTES
Pt daughter c/o with concern for managing insulin dosing. Stated her mother usually manages independently. This RN printed education materials from [employer] and educated on long and short acting insulin and the importance on checking blood sugars regularly. All questions answered regarding diabetes education. This RN will remain available for questions. Pt  angry bc daughter was educated on d/c instructions and had been informed earlier on that the radiologist would be in to discuss treatment. This nurse called Dr. Panchito Romeo to clarify pt d/c instructions. Dr. Panchito Romeo stated the radiologist would be in around 1:30, radiologist in to see patient and pt . Follow up appointment made with Dr. Panchito Romeo and separate AVS printed for husbands knowledge.  explained pt's daughter is his step daughter and they do not always see eye to eye. He explained they both care for patient. This nurse provided education and instructions on  Follow up appointments and changes in patient medications. Assisted with d/c, pt home with  and plans to receive hospital bed tomorrow.   Horacio Mcneill, BSN, RN

## 2019-06-16 PROBLEM — C80.1 SMALL CELL CARCINOMA (HCC): Status: ACTIVE | Noted: 2019-01-01

## 2019-06-25 NOTE — LETTER
[address]  [address] [address]  Phone: [phone]  Fax: [phone]            June 25, 2019    Lc Handler  [address] [place] [address] [address] [place] [address]      Dear Cinthya Mayfield: This is your CARELINK schedule. Please yaakov your calendar with these dates. You can do your checks anytime during the scheduled day. Since we do not do reminder calls, it will be your responsibility to perform the checks on the day it is scheduled. If you have any questions or concerns, please call and ask for Sharad Mixon  at [phone].

## 2019-07-13 PROBLEM — D61.818 PANCYTOPENIA (HCC): Status: ACTIVE | Noted: 2019-01-01

## 2019-07-13 NOTE — ED NOTES
Bed: ED-19  Expected date:   Expected time:   Means of arrival:   Comments:  Eliazar Madrigal, RN  07/13/19 7283

## 2019-07-13 NOTE — ED NOTES
Lab called with critical hemoglobin of 6.2. Dr Corbin Bliss notified.       Nayely Fagan RN  07/13/19 1920

## 2019-07-13 NOTE — ED NOTES
Patient presents to ER with c/o abdominal pain, nausea, vomiting and diarrhea. Spouse states that patient had last radiation treatment on Friday.       Lia Goldberg RN  07/13/19 1833

## 2019-07-13 NOTE — ED PROVIDER NOTES
HPI    80 YOF with hx of cancer of the lungs presents to the ED for epigastric abdominal pain with nausea, vomiting x1 and diarrhea x1 that occurred 1 hr priot to arrival. Epigastric pain is sharp and dull and intermittent in nature. PMH: lung cancers, DM  PSH: Cholecystectomy, hysterectomy, pancreatic surgery    Last radiation on Friday  Last chemotherapy on July 5th    Onc: Dr. Jake Abbott    Past Medical History:   Diagnosis Date    Arrhythmia     Arthritis     Bladder cancer ([facility] [address]     Blood circulation, collateral     CAD (coronary artery disease)     Chronic kidney disease, stage III (moderate) ([facility] [address] 5/5/2015    DM (diabetes mellitus) ([facility] [address]     Glaucoma     H/O Doppler ultrasound 11/25/2013;12/11 11/13--PERIPHERAL arterial duplex patent aortobifem bypasses;12/11--Normal study.  HTN (hypertension)     Hx of 24 hour EKG monitoring 01-    Max heart rate 94, minimum is 45 and average is 66,  Longesy RR is 2.8 seconds                                                            Hx of cardiovascular stress test 12/11     EF 70%    Hx of echocardiogram 03/08    EF>55%    Hyperlipidemia     Murmur     Pneumonia     PVD (peripheral vascular disease) ([facility] [address]     Rhabdomyolysis 5/5/2015     Past Surgical History:   Procedure Laterality Date    AORTA SURGERY  07/07    BACK SURGERY      [place]    CHOLECYSTECTOMY      COLONOSCOPY      EYE SURGERY      HYSTERECTOMY      KIDNEY SURGERY      left    LUNG BIOPSY Right     Dr Nilsa Miranda  03/2016    PACEMAK[facility] PLACEMENT      [facility]    PANCREAS SURGERY      VASCULAR SURGERY           Review of Systems   Constitutional: Positive for activity change. HENT: Negative for sore throat. Respiratory: Negative for cough and shortness of breath. Cardiovascular: Negative for chest pain. Gastrointestinal: Positive for abdominal pain (RUQ), diarrhea, nausea and vomiting.    Genitourinary: Negative for flank pain.   Musculoskeletal: Negative for back pain. Skin: Negative for color change. Neurological: Positive for weakness (generalized). Negative for headaches. Psychiatric/Behavioral: Negative for confusion. Physical Exam   Constitutional:   Non-toxic appearance, but appears fatigued, able to converse with me   HENT:   Head: Normocephalic and atraumatic. Mouth/Throat: No oropharyngeal exudate. Eyes: Right eye exhibits no discharge. Left eye exhibits no discharge. Patient is tracking me as I am walking around the room without noted EOM palsy   Neck: No tracheal deviation present. Cardiovascular: Intact distal pulses. Exam reveals no gallop and no friction rub. Pulmonary/Chest: No respiratory distress. She has no wheezes. She has no rales. Abdominal: Soft. There is tenderness (mild in epigastric region). There is no guarding. Musculoskeletal: She exhibits no tenderness or deformity. Neurological: She is alert. Skin:   Pale color   Psychiatric: Judgment normal.       Procedures    MDM         Discussed patient with her oncologist on call who recommended patient be placed in the hospital due to her worsening anemia, leukopenia as well as decreased platelet count in the context of radiation yesterday and chemotherapy on the 5th of July. She was consented for 1U pRBC. She improved with zofran initially but was unable to tolerate PO intake as the nasuea resumed and therefore a second dose was given. CT scan shows ileus with no bowel obstruction. Discussed initially with Dr. Marina Lopez for admission. I was then informed that this patient should have been admitted to Dr Chandrakant Beaver and therefore Dr. Marina Lopez was updated on this. EKG Interpretation.    NSR, HR 80, QRSd 86, Qtc 402, normal axis, 1st degree AV block, nonspecfic TWI in lead V1 and III    Problem List Items Addressed This Visit     None      Visit Diagnoses     Nausea and vomiting, intractability of vomiting not specified, unspecified vomiting

## 2019-07-14 NOTE — H&P
Medications   Medication Dose Route Frequency Provider Last Rate Last Dose    hyoscyamine (LEVSIN/SL) sublingual tablet 125 mcg  125 mcg Sublingual Q4H PRN Olga Mclaughlin MD   125 mcg at 07/13/19 1912    0.9 % sodium chloride bolus  250 mL Intravenous Once Olga Mclaughlin MD        labetalol (NORMODYNE;TRANDATE) injection syringe 5 mg  5 mg Intravenous Once Olga Mclaughlin MD   Stopped at 07/13/19 2113     Current Outpatient Medications   Medication Sig Dispense Refill    Pegfilgrastim (NEULASTA DELIVERY KIT SC) Inject into the skin      traZODone (DESYREL) 50 MG tablet Take 1 tablet by mouth nightly 30 tablet 5    insulin detemir (LEVEMIR FLEXPEN) 100 UNIT/ML injection pen Inject 20 Units into the skin nightly 40 units in the morning and 30 units in the evening 5 pen 3    furosemide (LASIX) 40 MG tablet Take 0.5 tablets by mouth daily 60 tablet 3    albuterol sulfate  (90 Base) MCG/ACT inhaler Inhale 2 puffs into the lungs 4 times daily as needed for Wheezing 3 Inhaler 1    Dulaglutide (TRULICITY) 6.47 JA/1.5UB SOPN Inject into the skin once a week      amLODIPine (NORVASC) 10 MG tablet Take 1 tablet by mouth daily 30 tablet 5    ramipril (ALTACE) 5 MG capsule Take 5 mg by mouth daily      latanoprost (XALATAN) 0.005 % ophthalmic solution Place 1 drop into both eyes nightly      Insulin Aspart (NOVOLOG FLEXPEN SC) Inject into the skin 3 times daily Patient is on a sliding scale       polyethylene glycol (GLYCOLAX) packet Take 17 g by mouth daily 527 g 1    aspirin 81 MG EC tablet Take 81 mg by mouth daily.  omeprazole (PRILOSEC) 20 MG capsule Take 20 mg by mouth daily.  metoprolol succinate (TOPROL XL) 50 MG extended release tablet Take 50 mg by mouth daily       mirtazapine (REMERON) 15 MG tablet Take 7.5 mg by mouth nightly       gabapentin (NEURONTIN) 300 MG capsule Take 300 mg by mouth 3 times daily.            Allergies  Allergies   Allergen Reactions    Vytorin [Ezetimibe-Simvastatin] Other (See Comments)     Rhabdomyolysis    Aricept [Donepezil Hcl]      Muscle cramps    Boniva [Ibandronic Acid]     Nsaids        REVIEW OF SYSTEMS   Within above limitations. 14 point review of systems reviewed. Pertinent positive or negative as per HPI or otherwise negative per 14 point systems review. PHYSICAL EXAM     Wt Readings from Last 3 Encounters:   07/13/19 140 lb (63.5 kg)   06/03/19 140 lb (63.5 kg)   11/05/18 138 lb (62.6 kg)       Blood pressure (!) 148/71, pulse 105, temperature 98.7 °F (37.1 °C), temperature source Oral, resp. rate 23, height 5' 5\" (1.651 m), weight 140 lb (63.5 kg), SpO2 96 %, not currently breastfeeding. General - AAO x 3, shivering   Psych - Appropriate affect/speech. No agitation  Eyes - Eye lids intact. No scleral icterus  ENT - Lips wnl. External ear clear/dry/intact. No thyromegaly on inspection  Neuro - No gross peripheral or central neuro deficits on inspection  Heart - Sinus. RRR. S1 and S2 present. No added HS/murmurs appreciated. No elevated JVD appreciated  Lung - Adequate air entry b/l, No crackles/wheezes appreciated  GI - Soft. No guarding/rigidity. No hepatosplenomegaly/ascites. BS+   - No CVA/suprapubic tenderness or palpable bladder distension  Skin - Intact. No rash/petechiae/ecchymosis. Warm extremities  MSK - Joints with normal ROM.  No joint swellings    Lines/Drains/Airways/Wounds:  [unfilled]    LABS AND IMAGING   CBC  [unfilled]    Last 3 Hemoglobin  Lab Results   Component Value Date    HGB  07/13/2019     6.2  HGB CALLED TO ANICETO GRAY RN ON 7/13/19 AT 1912 BY DRAKE ROMNA CLS  RESULTS READ BACK      HGB 10.8 06/10/2019    HGB 11.7 06/06/2019     Last 3 WBC/ANC  Lab Results   Component Value Date    WBC 3.1 07/13/2019    WBC 10.7 06/10/2019    WBC 14.3 06/06/2019     No components found for: GRNLOCTYABS  Last 3 Platelets  No results found for: PLATELET  Chemistry  [unfilled]  [unfilled]  Lab Results   Component Value Date     06/24/2019     Coagulation Studies  Lab Results   Component Value Date    INR 1.51 07/13/2019     Liver Function Studies  Lab Results   Component Value Date    ALT 19 07/13/2019    AST 20 07/13/2019    ALKPHOS 187 07/13/2019       Recent Imaging        Relevant labs and imaging reviewed    ASSESSMENT AND PLAN   Yeni Shields is a 80 y.o. female with a past medical history of  small cell lung cancer, diabetes, hypertension     Pancytopenia and N/V/D in setting of recent CTX, with a history of patient having small cell lung cancer  No signs of infection  Last radiation on Friday, 7/12  Last chemotherapy on July 5th  Continue with IV hydration 125 mL an hour normal saline  Status post 1 unit blood in the ED  Repeat hemoglobin after unit of blood  Likely need filgrastim ordered as per oncologist, likely tomorrow  Monitor CBC daily  Continue Zofran, PRN Ativan    DM  - on Lantus 40 units qAM and 30 units qPM at home  - will give lantus 20 units BID given decreased PO intake  - ISS  - TIDAC 5 units aspart, and low dose sliding scale    HTN  - c/w amlodapine and ramipril     Prophylaxis with Lovenox, monitor platelets    Case d/w ED physician    [facility], Internal Medicine  7/13/2019 at 9:52 PM

## 2019-07-14 NOTE — CONSULTS
 EYE SURGERY      HYSTERECTOMY      KIDNEY SURGERY      left    LUNG BIOPSY Right     Dr Ackerman Linear  03/2016    [facility]      [facility]    PANCREAS SURGERY      VASCULAR SURGERY         Current Medications:   Current Facility-Administered Medications   Medication Dose Route Frequency Provider Last Rate Last Dose    hydrALAZINE (APRESOLINE) 10 mg in sodium chloride 0.9 % 50 mL ivpb  10 mg Intravenous Q4H PRN Sonu Mccormack PA-C   Stopped at 07/14/19 0155    HYDROmorphone (DILAUDID) injection 1 mg  1 mg Intravenous Q4H PRN Satish Orosco MD   1 mg at 07/14/19 1451    hyoscyamine (LEVSIN/SL) sublingual tablet 125 mcg  125 mcg Sublingual Q4H PRN Arti Stevenson MD   125 mcg at 07/13/19 1912    0.9 % sodium chloride bolus  250 mL Intravenous Once Arti Stevenson MD   Stopped at 07/14/19 0117    labetalol (NORMODYNE;TRANDATE) injection syringe 5 mg  5 mg Intravenous Once Arti Stevenson MD   Stopped at 07/13/19 2113    albuterol sulfate  (90 Base) MCG/ACT inhaler 2 puff  2 puff Inhalation 4x Daily PRN Otto Marquis Syed MD        amLODIPine (NORVASC) tablet 10 mg  10 mg Oral Daily Otto Marquis Syed MD   10 mg at 07/14/19 1006    furosemide (LASIX) tablet 20 mg  20 mg Oral Daily Cruz LUONG MD   20 mg at 07/14/19 1007    gabapentin (NEURONTIN) capsule 300 mg  300 mg Oral TID Rashid Box MD   300 mg at 07/14/19 1312    insulin detemir (LEVEMIR) injection vial 20 Units  20 Units Subcutaneous BID Otto Marquis Syed MD   20 Units at 07/14/19 1015    latanoprost (XALATAN) 0.005 % ophthalmic solution 1 drop  1 drop Both Eyes Nightly Otto Marquis Syed MD   Stopped at 07/14/19 0009    metoprolol succinate (TOPROL XL) extended release tablet 50 mg  50 mg Oral Daily Otto Marquis Syed MD   50 mg at 07/14/19 1007    mirtazapine (REMERON) tablet 7.5 mg  7.5 mg Oral Nightly Otto Marquis Syed MD        pantoprazole (PROTONIX) tablet 40 mg  40 mg Oral QAM AC [facility], MD        ramipril

## 2019-07-14 NOTE — ANESTHESIA PRE PROCEDURE
[Ibandronic Acid]     Nsaids        Problem List:    Patient Active Problem List   Diagnosis Code    DM (diabetes mellitus) E11.9    Hyperlipidemia E78.5    PVD (peripheral vascular disease) I73.9    HTN (hypertension) I10    Chronic kidney disease, stage III (moderate) N18.3    Weakness R53.1    Fever R50.9    Sepsis ([facility] [address] A41.9    Hyponatremia E87.1    Bacteremia due to Klebsiella pneumoniae R78.81    Pneumobilia K83.8    Cardiac pacemaker Z95.0    DM (diabetes mellitus), secondary uncontrolled ([facility] [address] E13.65    Leg weakness, bilateral R29.898    Gait disturbance R26.9    Acute cystitis without hematuria N30.00    Pain in pacemaker pocket R52    Lung mass R91.8    Small cell carcinoma (HCC) C80.1    Pancytopenia (HCC) O44.280       Past Medical History:        Diagnosis Date    Arrhythmia     Arthritis     Bladder cancer ([facility] [address]     Blood circulation, collateral     CAD (coronary artery disease)     Chronic kidney disease, stage III (moderate) (HCC) 5/5/2015    DM (diabetes mellitus) ([facility] [address]     Glaucoma     H/O Doppler ultrasound 11/25/2013;12/11 11/13--PERIPHERAL arterial duplex patent aortobifem bypasses;12/11--Normal study.     HTN (hypertension)     Hx of 24 hour EKG monitoring 01-    Max heart rate 94, minimum is 45 and average is 66,  Longesy RR is 2.8 seconds                                                            Hx of cardiovascular stress test 12/11     EF 70%    Hx of echocardiogram 03/08    EF>55%    Hyperlipidemia     Murmur     Pneumonia     PVD (peripheral vascular disease) (HCC)     Rhabdomyolysis 5/5/2015       Past Surgical History:        Procedure Laterality Date    AORTA SURGERY  07/07    BACK SURGERY      [place]    CHOLECYSTECTOMY      COLONOSCOPY      EYE SURGERY      HYSTERECTOMY      KIDNEY SURGERY      left    LUNG BIOPSY Right     Dr Han Nolan  03/2016   Graciela Quijano PANCREAS SURGERY      VASCULAR SURGERY         Social History:    Social History     Tobacco Use    Smoking status: Former Smoker     Packs/day: 1.00     Years: 50.00     Pack years: 50.00     Start date: 3/2/1960     Last attempt to quit: 3/2/1998     Years since quittin.3    Smokeless tobacco: Never Used   Substance Use Topics    Alcohol use: No     Comment: 2 cups coffee daily                                Counseling given: Not Answered      Vital Signs (Current):   Vitals:    19 1004 19 1636 19 1820 19 1821   BP: (!) 151/70 (!) 151/65  (!) 131/55   Pulse: 113 104 117 119   Resp:    Temp: 98.3 °F (36.8 °C) 97.7 °F (36.5 °C) 98 °F (36.7 °C)    TempSrc: Oral Oral Temporal    SpO2: 92% 95% 99% 98%   Weight:       Height:                                                  BP Readings from Last 3 Encounters:   19 (!) 131/55   19 (!) 146/58   06/10/19 (!) 148/63       NPO Status: Time of last liquid consumption: 1200                        Time of last solid consumption: 1200                        Date of last liquid consumption: 19                        Date of last solid food consumption: 19    BMI:   Wt Readings from Last 3 Encounters:   19 132 lb (59.9 kg)   19 140 lb (63.5 kg)   18 138 lb (62.6 kg)     Body mass index is 21.97 kg/m².     CBC:   Lab Results   Component Value Date    WBC 3.9 2019    RBC 3.60 2019    HGB 10.2 2019    HCT 32.1 2019    MCV 89.2 2019    RDW 15.6 2019      RESULTS CONFIRMED BY SMEAR REVIEW   2019       CMP:   Lab Results   Component Value Date     2019    K 4.0 2019    CL 95 2019    CO2 17 2019    BUN 11 2019    CREATININE 0.9 2019    GFRAA >60 2019    LABGLOM 59 2019    GLUCOSE 474 2019    PROT 6.5 2019    CALCIUM 8.9 2019    BILITOT 1.4 2019    ALKPHOS 202 2019    AST 26

## 2019-07-14 NOTE — H&P
current or ex partner: Not on file     Emotionally abused: Not on file     Physically abused: Not on file     Forced sexual activity: Not on file   Other Topics Concern    Not on file   Social History Narrative    Not on file       Family History:  Family History   Problem Relation Age of Onset    Dementia Father     Heart Failure Father         CVA    Stroke Father     Diabetes Mother     Heart Failure Brother         CVA       Allergies: Allergies   Allergen Reactions    Vytorin [Ezetimibe-Simvastatin] Other (See Comments)     Rhabdomyolysis    Aricept [Donepezil Hcl]      Muscle cramps    Boniva [Ibandronic Acid]     Nsaids        Medications:  No current facility-administered medications on file prior to encounter. Current Outpatient Medications on File Prior to Encounter   Medication Sig Dispense Refill    Dulaglutide (TRULICITY) 5.83 ZP/5.9WH SOPN Inject into the skin once a week      amLODIPine (NORVASC) 10 MG tablet Take 1 tablet by mouth daily 30 tablet 5    ramipril (ALTACE) 5 MG capsule Take 5 mg by mouth daily      latanoprost (XALATAN) 0.005 % ophthalmic solution Place 1 drop into both eyes nightly      Insulin Aspart (NOVOLOG FLEXPEN SC) Inject into the skin 3 times daily Patient is on a sliding scale       polyethylene glycol (GLYCOLAX) packet Take 17 g by mouth daily 527 g 1    aspirin 81 MG EC tablet Take 81 mg by mouth daily.  omeprazole (PRILOSEC) 20 MG capsule Take 20 mg by mouth daily.  metoprolol succinate (TOPROL XL) 50 MG extended release tablet Take 50 mg by mouth daily       mirtazapine (REMERON) 15 MG tablet Take 7.5 mg by mouth nightly       gabapentin (NEURONTIN) 300 MG capsule Take 300 mg by mouth 3 times daily. Vital Signs:  @FLOWDT(6:last)@ @FLOWSTATM(6:24)@ @FLOWDT(5:last)@ @FLOWDT(8:last)@ @FLOWDT(9:last)@ @FLOWDT(10:last)@     Body mass index is 24.8 kg/m².     Laboratory:  Recent Labs     07/13/19  1818 07/13/19  1904 07/14/19  0232   WBC 3.1*  --  2.4*   *  --  133*   CL 94*  --  95*   CO2 23  --  17*   BUN 11  --  11   CREATININE 0.7  --  0.9   GLUCOSE 313*  --  474*   INR  --  1.51  --      INR @LABR24(INR)@    Physical Exam:  GENERAL:Well developed, well nourished in NAD  NECK: Neck exam - No JVD,HJR or carotid bruit, no thyromegaly   RESPIRATORY:Clear to auscultation  HEART:RRR,no murmer, gallop or friction rub  ABDOMEN: Bowel sounds present, no tenderness to palpation.  No organomegaly  EXTREMITIES: no edema or cyanosis  VASCULAR:  no ischemic changes  SKIN: no erythema, rubor or lesions noted  NEURO/PSYCH:Alert and oriented    EKG:    Imaging:    Chest: CTA    Heart: S1, S1        Mallampati Score 2  ASA class 2    PLAN OF CARE/PLANNED PROCEDURE    Electronically signed by Edna Lima MD on 7/14/2019 at 10:10 AM

## 2019-07-15 NOTE — DISCHARGE SUMMARY
[facility]  Discharge Summary     Patient ID  Mohit Huerta   [id number]  [id number]          Admit date: 2019   Discharge date: 7/15/2019      Admitting Physician: Sancho Anthony MD   Discharge Physician: Sancho Anthony MD    Discharge Diagnoses:     1. Ischemic bowel, sepsis, acute resp failure. Prognosis is very poor. Family wants only comfort care. 2. Pancytopenia due to chemo. Pt had one unit PRBC. 2. Rt lung cancer, small cell cancer. S/P Chemo and radiation treatment. 3. DM.    4. HTN. 5. Pt  on 07/15/2019. Discharged Condition: pt . Hospital Course:    Pt presented to ER with nausea, vomiting, abdominal pain. Recently diagnosed with [place] right lung cancer. She has undergone chemo and radiation recently. Initially pt seen in ER and then admitted by the hospitalist service. Pt transferred to my service on 2019. All labs, CT abdomen reviewed. Consulted Dr Sheeba Byrnes from general surgery. He has reviewed the CT scan. Clinically and radiologically diagnosed with ischemic bowel. He discussed with family. Pt was taken to OR and was noted to have complete bowel ischemia. Surgery was terminated. Pt was admitted to ICU, intubated, on vent and sedated. [place] I met with them on the morning of 07/15/2019. They all requested only comfort care. All questions answered. Family very satisfied with care provided. She was made DNR CC. IV pressors were stopped. She  later in the day. Consults:     pulmonary/intensive care and general surgery     Significant Diagnostic Studies:   Ct Abdomen Pelvis W Iv Contrast Additional Contrast? None    Result Date: 2019  EXAMINATION: CT OF THE ABDOMEN AND PELVIS WITH CONTRAST 2019 7:29 pm TECHNIQUE: CT of the abdomen and pelvis was performed with the administration of intravenous contrast. Multiplanar reformatted images are provided for review.  Dose modulation, iterative

## 2019-07-15 NOTE — CONSULTS
aortobifem bypasses;12/11--Normal study.  HTN (hypertension)     Hx of 24 hour EKG monitoring 01-    Max heart rate 94, minimum is 45 and average is 66,  Longesy RR is 2.8 seconds                                                            Hx of cardiovascular stress test 12/11     EF 70%    Hx of echocardiogram 03/08    EF>55%    Hyperlipidemia     Murmur     Pneumonia     PVD (peripheral vascular disease) ([address]     Rhabdomyolysis 5/5/2015      Past Surgical History:        Procedure Laterality Date    AORTA SURGERY  07/07    BACK SURGERY      [place]    CHOLECYSTECTOMY      COLONOSCOPY      EYE SURGERY      HYSTERECTOMY      KIDNEY SURGERY      left    LAPAROTOMY EXPLORATORY N/A 7/14/2019    LAPAROTOMY EXPLORATORY performed by Adenike Campos MD at [address] [place] [address]. Right     Dr Cathie Ackerman  03/2016    PACEMAKER PLACEMENT      [facility]    PANCREAS SURGERY      VASCULAR SURGERY       Current Medications:     chlorhexidine  15 mL Mouth/Throat BID    sodium chloride  250 mL Intravenous Once    labetalol  5 mg Intravenous Once    amLODIPine  10 mg Oral Daily    furosemide  20 mg Oral Daily    gabapentin  300 mg Oral TID    insulin detemir  20 Units Subcutaneous BID    latanoprost  1 drop Both Eyes Nightly    metoprolol succinate  50 mg Oral Daily    mirtazapine  7.5 mg Oral Nightly    pantoprazole  40 mg Oral QAM AC    ramipril  5 mg Oral Daily    traZODone  50 mg Oral Nightly    sodium chloride flush  10 mL Intravenous 2 times per day    enoxaparin  40 mg Subcutaneous Daily    insulin lispro  0.08 Units/kg Subcutaneous TID WC    insulin lispro  0-6 Units Subcutaneous TID WC    insulin lispro  0-3 Units Subcutaneous Nightly     Allergies:    Social History:    TOBACCO:   reports that she quit smoking about 21 years ago. She started smoking about 59 years ago. She has a 50.00 pack-year smoking history.  She has never used smokeless Net 2171.55 ml     CURRENT PULSE OXIMETRY:  SpO2: 99 %  24HR PULSE OXIMETRY RANGE:  SpO2  Av.5 %  Min: 56 %  Max: 100 %    CONSTITUTIONAL:  awake, alert, cooperative, no apparent distress, and appears stated age  NECK:  Supple, symmetrical, trachea midline, no adenopathy, thyroid symmetric, not enlarged and no tenderness, skin normal  LUNGS:  no increased work of breathing and clear to auscultation. No accessory muscle use  CARDIOVASCULAR:  normal S1 and S2, no edema and no JVD  ABDOMEN:  normal bowel sounds, non-distended and no masses palpated, and no tenderness to palpation. No hepatospleenomegaly  LYMPHADENOPATHY:  no axillary or supraclavicular adenopathy. No cervical adnenopathy  PSYCHIATRIC: Oriented to person place and time. No obvious depression or anxiety. MUSCULOSKELETAL: No obvious misalignment or effusion of the joints. No clubbing, cyanosis of the digits. SKIN:  normal skin color, texture, turgor and no redness, warmth, or swelling.  No palpable nodules    DATA:    Old records have been reviewed  CBC with Differential:    Lab Results   Component Value Date    WBC 3.9 2019    RBC 3.60 2019    HGB 10.2 2019    HCT 32.1 2019      RESULTS CONFIRMED BY SMEAR REVIEW   2019    MCV 89.2 2019    MCH 28.3 2019    MCHC 31.8 2019    RDW 15.6 2019    NRBC 2 2019    SEGSPCT 37.0 2019    BANDSPCT 28 2019    LYMPHOPCT 13.0 2019    MONOPCT 13.0 2019    MYELOPCT 2 2019    BASOPCT 2.0 2019    MONOSABS 0.5 2019    LYMPHSABS 0.5 2019    EOSABS 0.1 2019    BASOSABS 0.1 2019    DIFFTYPE MANUAL DIFFERENTIAL 2019     BMP:    Lab Results   Component Value Date     2019    K 4.0 2019    CL 95 2019    CO2 17 2019    BUN 11 2019    CREATININE 0.9 2019    CALCIUM 8.9 2019    GFRAA >60 2019    LABGLOM 59 2019    GLUCOSE 474 2019

## 2019-07-15 NOTE — ANESTHESIA POSTPROCEDURE EVALUATION
Department of Anesthesiology  Postprocedure Note    Patient: William Fernández  MRN: [id number]  YOB: 1931  Date of evaluation: 7/14/2019  Time:  8:52 PM     Procedure Summary     Date:  07/14/19 Room / Location:  [place] [id number] 03 / [facility]    Anesthesia Start:  1906 Anesthesia Stop:  2048    Procedure:  LAPAROTOMY EXPLORATORY (N/A Abdomen) Diagnosis:  (ISCHEMIC BOWEL)    Surgeon:  Yolande Gaucher, MD Responsible Provider:  Daniel Leonard MD    Anesthesia Type:  general ASA Status:  4 - Emergent          Anesthesia Type: general    Carmen Phase I:  N/A    [place] Phase II:  N/A    Last vitals: Reviewed and per EMR flowsheets.        Anesthesia Post Evaluation    Patient location during evaluation: ICU  Patient participation: complete - patient cannot participate  Level of consciousness: sedated and ventilated  Pain score: 0  Airway patency: patent  Nausea & Vomiting: no nausea and no vomiting  Complications: no  Cardiovascular status: vasoactive/inotropes  Respiratory status: ventilator, acceptable and intubated  Hydration status: stable

## 2019-07-15 NOTE — FLOWSHEET NOTE
Family has refused morning labs and ABG, daughter stating Xiomara Mckinney is the point? It won't change anything. \" This RN to respect family's wishes at this time. Will continue to offer care as ordered by physicians and to respect family's wishes.

## 2019-07-16 LAB
EKG ATRIAL RATE: 80 BPM
EKG DIAGNOSIS: NORMAL
EKG P AXIS: 45 DEGREES
EKG P-R INTERVAL: 236 MS
EKG Q-T INTERVAL: 402 MS
EKG QRS DURATION: 86 MS
EKG QTC CALCULATION (BAZETT): 463 MS
EKG R AXIS: -13 DEGREES
EKG T AXIS: 20 DEGREES
EKG VENTRICULAR RATE: 80 BPM

## 2019-11-17 NOTE — PROGRESS NOTES
Pulmonary and Critical Care  Progress Note    Subjective: The patient is improving. Path Small cell Ca. Shortness of breath none  Chest pain none  Addressing respiratory complaints Patient is negative for  hemoptysis and cyanosis  CONSTITUTIONAL:  negative for fevers and chills      Past Medical History:     has a past medical history of Arrhythmia, Arthritis, Bladder cancer ([facility] Blood circulation, collateral, CAD (coronary artery disease), Chronic kidney disease, stage III (moderate) ([facility] [facility] DM (diabetes mellitus) ([facility] [facility] Glaucoma, H/O Doppler ultrasound, HTN (hypertension), Hx of 24 hour EKG monitoring, Hx of cardiovascular stress test, Hx of echocardiogram, Hyperlipidemia, Murmur, Pneumonia, PVD (peripheral vascular disease) ([facility] [address] and Rhabdomyolysis. has a past surgical history that includes Bladder surgery (1990); Cholecystectomy; Hysterectomy; back surgery; Aorta surgery (07/07); Kidney surgery; Colonoscopy; Pancreas surgery; eye surgery; pacemaker placement; vascular surgery; Pacemaker insertion (03/2016); and Lung biopsy (Right). reports that she quit smoking about 21 years ago. She started smoking about 59 years ago. She has a 50.00 pack-year smoking history. She has never used smokeless tobacco. She reports that she does not drink alcohol or use drugs. Family history:  family history includes Dementia in her father; Diabetes in her mother; Heart Failure in her brother and father; Stroke in her father.     Allergies   Allergen Reactions    Vytorin [Ezetimibe-Simvastatin] Other (See Comments)     Rhabdomyolysis    Aricept [Donepezil Hcl]      Muscle cramps    Boniva [Ibandronic Acid]     Nsaids      Social History:    Reviewed; no changes    Objective:   PHYSICAL EXAM:        VITALS:  /62   Pulse 81   Temp 98.2 °F (36.8 °C) (Oral)   Resp 17   Ht 5' 3\" (1.6 m)   Wt 140 lb (63.5 kg)   SpO2 98%   BMI 24.80 kg/m²     24HR INTAKE/OUTPUT:      Intake/Output Summary (Last 24 hours) at Walking

## 2023-12-22 NOTE — TELEPHONE ENCOUNTER
Called and reminded patient to send [employer] transmission.  said that they tried to send and kept getting error [id number]. He did call [employer] and they told him to reset and try again in 1/2 hour. He said that they did and still have the error. I told him to call them back as the battery is not charging and they will have to send out a new monitor. He voiced understanding. Cellulitis

## (undated) DEVICE — STAPLER INT L75MM CUT LN L73MM STPL LN L77MM BLU B FRM 8

## (undated) DEVICE — SUTURE PERMAHAND SZ 3-0 L30IN NONABSORBABLE BLK L26MM SH C017D

## (undated) DEVICE — CHLORAPREP 26ML ORANGE

## (undated) DEVICE — Device

## (undated) DEVICE — JELLY,LUBE,STERILE,FLIP TOP,TUBE,2-OZ: Brand: MEDLINE

## (undated) DEVICE — MARKER,SKIN,WI/RULER AND LABELS: Brand: MEDLINE

## (undated) DEVICE — SHEET, T, LAPAROTOMY, STERILE: Brand: MEDLINE

## (undated) DEVICE — INTENDED FOR TISSUE SEPARATION, AND OTHER PROCEDURES THAT REQUIRE A SHARP SURGICAL BLADE TO PUNCTURE OR CUT.: Brand: BARD-PARKER ® STAINLESS STEEL BLADES

## (undated) DEVICE — DRAPE,LAPAROTOMY,PCH,STERILE: Brand: MEDLINE

## (undated) DEVICE — LINER,SEMI-RIGID,3000CC,50EA/CS: Brand: MEDLINE

## (undated) DEVICE — RELOAD STPL L55MM OPN H3.8MM CLS H1.5MM WIRE DIA0.2MM REG

## (undated) DEVICE — TUBING, SUCTION, 3/16" X 10', STRAIGHT: Brand: MEDLINE

## (undated) DEVICE — PACK,BASIC,IX: Brand: MEDLINE

## (undated) DEVICE — TUBING, SUCTION, 9/32" X 10', STRAIGHT: Brand: MEDLINE

## (undated) DEVICE — RELOAD STPL L75MM OPN H3.8MM CLS 1.5MM WIRE DIA0.2MM REG

## (undated) DEVICE — SPONGE LAP W18XL18IN WHT COT 4 PLY FLD STRUNG RADPQ DISP ST

## (undated) DEVICE — SUTURE PERMA-HAND SZ 3-0 L18IN 17 STRND NONABSORBABLE BLK SA64H

## (undated) DEVICE — GLOVE SURG SZ 7 L12IN FNGR THK87MIL WHT LTX FREE

## (undated) DEVICE — SUTURE STRATAFIX SYMMETRIC SZ 1 L18IN ABSRB VLT CT1 L36CM SXPP1A404

## (undated) DEVICE — COUNTER NDL 30 COUNT FOAM STRP SGL MAG

## (undated) DEVICE — GAUZE,SPONGE,4"X4",16PLY,XRAY,STRL,LF: Brand: MEDLINE

## (undated) DEVICE — SUTURE PERMAHAND SZ 2-0 L18IN NONABSORBABLE BLK L26MM SH C012D

## (undated) DEVICE — SOLUTION IV IRRIG WATER 1000ML POUR BRL 2F7114

## (undated) DEVICE — LINER SUCT CANSTR 1500CC SEMI RIG W/ POR HYDROPHOBIC SHUT

## (undated) DEVICE — CORD ES L15FT PT RET REUSE VALLEYLAB REM

## (undated) DEVICE — PAD REMOVER POLISH NAIL ACETONE

## (undated) DEVICE — ELECTRODE ES AD CRDLSS PT RET REM POLYHESIVE

## (undated) DEVICE — GOWN,SIRUS,POLYRNF,BRTHSLV,XLN/XL,20/CS: Brand: MEDLINE

## (undated) DEVICE — PENCIL ES CRD L10FT HND SWCHING ROCK SWCH W/ EDGE COAT BLDE

## (undated) DEVICE — YANKAUER,BULB TIP,W/O VENT,RIGID,STERILE: Brand: MEDLINE

## (undated) DEVICE — STAPLER INT L55MM CUT LN L53MM STPL LN L57MM BLU B FRM 8

## (undated) DEVICE — PAD,ABDOMINAL,5"X9",ST,LF,25/BX: Brand: MEDLINE INDUSTRIES, INC.

## (undated) DEVICE — SUTURE PDS II SZ 1 L96IN ABSRB VLT TP-1 L65MM 1/2 CIR Z880G

## (undated) DEVICE — DRESSING WND W4XL10IN UNIV ANTIMIC TELFA

## (undated) DEVICE — SUTURE PERMAHAND SZ 2-0 L17X18IN NONABSORBABLE BLK SILK SA65H

## (undated) DEVICE — SUTURE VCRL SZ 3-0 L18IN ABSRB UD L26MM SH 1/2 CIR J864D

## (undated) DEVICE — GLOVE SURG SZ 65 THK91MIL LTX FREE SYN POLYISOPRENE

## (undated) DEVICE — TOTAL TRAY, DB, 100% SILI FOLEY, 16FR 10: Brand: MEDLINE

## (undated) DEVICE — DRESSING TRNSPAR W5XL4.5IN FLM SHT SEMIPERMEABLE WIND

## (undated) DEVICE — DUAL LUMEN STOMACH TUBE: Brand: SALEM SUMP

## (undated) DEVICE — SOLUTION IV 1000ML 0.9% SOD CHL FOR IRRIG PLAS CONT

## (undated) DEVICE — SUTURE PERMAHAND SZ 3-0 L18IN NONABSORBABLE BLK L26MM SH C013D

## (undated) DEVICE — DRAPE SHEET ULTRAGARD: Brand: MEDLINE

## (undated) DEVICE — SUTURE VCRL SZ 3-0 L36IN ABSRB VLT SH L26MM 1/2 CIR DBL J527H

## (undated) DEVICE — TOWEL,OR,DSP,ST,BLUE,STD,6/PK,12PK/CS: Brand: MEDLINE

## (undated) DEVICE — ANESTHESIA CIRCUIT ADULT-LF: Brand: MEDLINE INDUSTRIES, INC.